# Patient Record
Sex: MALE | Race: BLACK OR AFRICAN AMERICAN | NOT HISPANIC OR LATINO | URBAN - METROPOLITAN AREA
[De-identification: names, ages, dates, MRNs, and addresses within clinical notes are randomized per-mention and may not be internally consistent; named-entity substitution may affect disease eponyms.]

---

## 2023-06-08 ENCOUNTER — OUTPATIENT (OUTPATIENT)
Dept: OUTPATIENT SERVICES | Facility: HOSPITAL | Age: 68
LOS: 1 days | End: 2023-06-08
Payer: MEDICARE

## 2023-06-08 DIAGNOSIS — Z98.890 OTHER SPECIFIED POSTPROCEDURAL STATES: Chronic | ICD-10-CM

## 2023-06-08 DIAGNOSIS — Z01.818 ENCOUNTER FOR OTHER PREPROCEDURAL EXAMINATION: ICD-10-CM

## 2023-06-08 LAB
ABO RH CONFIRMATION: SIGNIFICANT CHANGE UP
ANION GAP SERPL CALC-SCNC: 1 MMOL/L — LOW (ref 5–17)
BASOPHILS # BLD AUTO: 0.04 K/UL — SIGNIFICANT CHANGE UP (ref 0–0.2)
BASOPHILS NFR BLD AUTO: 0.7 % — SIGNIFICANT CHANGE UP (ref 0–2)
BLD GP AB SCN SERPL QL: SIGNIFICANT CHANGE UP
BUN SERPL-MCNC: 14 MG/DL — SIGNIFICANT CHANGE UP (ref 7–23)
CALCIUM SERPL-MCNC: 9 MG/DL — SIGNIFICANT CHANGE UP (ref 8.5–10.1)
CHLORIDE SERPL-SCNC: 106 MMOL/L — SIGNIFICANT CHANGE UP (ref 96–108)
CO2 SERPL-SCNC: 33 MMOL/L — HIGH (ref 22–31)
CREAT SERPL-MCNC: 0.95 MG/DL — SIGNIFICANT CHANGE UP (ref 0.5–1.3)
EGFR: 89 ML/MIN/1.73M2 — SIGNIFICANT CHANGE UP
EOSINOPHIL # BLD AUTO: 0.11 K/UL — SIGNIFICANT CHANGE UP (ref 0–0.5)
EOSINOPHIL NFR BLD AUTO: 2 % — SIGNIFICANT CHANGE UP (ref 0–6)
GLUCOSE SERPL-MCNC: 124 MG/DL — HIGH (ref 70–99)
HCT VFR BLD CALC: 50 % — SIGNIFICANT CHANGE UP (ref 39–50)
HGB BLD-MCNC: 16.1 G/DL — SIGNIFICANT CHANGE UP (ref 13–17)
IMM GRANULOCYTES NFR BLD AUTO: 0.4 % — SIGNIFICANT CHANGE UP (ref 0–0.9)
LYMPHOCYTES # BLD AUTO: 1.15 K/UL — SIGNIFICANT CHANGE UP (ref 1–3.3)
LYMPHOCYTES # BLD AUTO: 21.3 % — SIGNIFICANT CHANGE UP (ref 13–44)
MCHC RBC-ENTMCNC: 26.5 PG — LOW (ref 27–34)
MCHC RBC-ENTMCNC: 32.2 GM/DL — SIGNIFICANT CHANGE UP (ref 32–36)
MCV RBC AUTO: 82.2 FL — SIGNIFICANT CHANGE UP (ref 80–100)
MONOCYTES # BLD AUTO: 0.32 K/UL — SIGNIFICANT CHANGE UP (ref 0–0.9)
MONOCYTES NFR BLD AUTO: 5.9 % — SIGNIFICANT CHANGE UP (ref 2–14)
NEUTROPHILS # BLD AUTO: 3.77 K/UL — SIGNIFICANT CHANGE UP (ref 1.8–7.4)
NEUTROPHILS NFR BLD AUTO: 69.7 % — SIGNIFICANT CHANGE UP (ref 43–77)
PLATELET # BLD AUTO: 241 K/UL — SIGNIFICANT CHANGE UP (ref 150–400)
POTASSIUM SERPL-MCNC: 3.8 MMOL/L — SIGNIFICANT CHANGE UP (ref 3.5–5.3)
POTASSIUM SERPL-SCNC: 3.8 MMOL/L — SIGNIFICANT CHANGE UP (ref 3.5–5.3)
RBC # BLD: 6.08 M/UL — HIGH (ref 4.2–5.8)
RBC # FLD: 14.1 % — SIGNIFICANT CHANGE UP (ref 10.3–14.5)
SODIUM SERPL-SCNC: 140 MMOL/L — SIGNIFICANT CHANGE UP (ref 135–145)
WBC # BLD: 5.41 K/UL — SIGNIFICANT CHANGE UP (ref 3.8–10.5)
WBC # FLD AUTO: 5.41 K/UL — SIGNIFICANT CHANGE UP (ref 3.8–10.5)

## 2023-06-08 PROCEDURE — 93010 ELECTROCARDIOGRAM REPORT: CPT

## 2023-06-08 PROCEDURE — 86850 RBC ANTIBODY SCREEN: CPT

## 2023-06-08 PROCEDURE — 93005 ELECTROCARDIOGRAM TRACING: CPT

## 2023-06-08 PROCEDURE — 86900 BLOOD TYPING SEROLOGIC ABO: CPT

## 2023-06-08 PROCEDURE — 36415 COLL VENOUS BLD VENIPUNCTURE: CPT

## 2023-06-08 PROCEDURE — 85025 COMPLETE CBC W/AUTO DIFF WBC: CPT

## 2023-06-08 PROCEDURE — 80048 BASIC METABOLIC PNL TOTAL CA: CPT

## 2023-06-08 PROCEDURE — 87641 MR-STAPH DNA AMP PROBE: CPT

## 2023-06-08 PROCEDURE — 87640 STAPH A DNA AMP PROBE: CPT

## 2023-06-08 PROCEDURE — 86901 BLOOD TYPING SEROLOGIC RH(D): CPT

## 2023-06-08 NOTE — CHART NOTE - NSCHARTNOTEFT_GEN_A_CORE
6/8/2023--67 y.o male scheduled for a Left Inguinal Hernia Repair  VS: BP- 159/81  P- 66 T- 97.6 SpO2- 98%  Plan  1. Stop all NSAIDS, herbal supplements and vitamins for 7 days.  2. NPO at midnight.  3. Take the following medications Labetalol, Amlodipine, Lisinopril with small sips of water on the morning of your procedure/surgery.  4. Use EZ sponges as directed  5. Use mupirocin as directed  6. Labs, EKG as per surgeon  7. PMD SARAH Blackburn  visit for optimization prior to surgery as per surgeon.  8. Preprocedure education provided

## 2023-06-08 NOTE — ASU PATIENT PROFILE, ADULT - ALCOHOL USE HISTORY SINGLE SELECT
Quality 431: Preventive Care And Screening: Unhealthy Alcohol Use - Screening: Patient screened for unhealthy alcohol use using a single question and scores less than 2 times per year
Quality 111:Pneumonia Vaccination Status For Older Adults: Pneumococcal Vaccination Previously Received
Quality 130: Documentation Of Current Medications In The Medical Record: Current Medications Documented
Detail Level: Zone
Quality 226: Preventive Care And Screening: Tobacco Use: Screening And Cessation Intervention: Patient screened for tobacco and is an ex-smoker
Quality 110: Preventive Care And Screening: Influenza Immunization: Influenza immunization was not ordered or administered, reason not given
Quality 47: Advance Care Plan: Advance Care Planning discussed and documented in the medical record; patient did not wish or was not able to name a surrogate decision maker or provide an advance care plan.
yes...

## 2023-06-08 NOTE — ASU PATIENT PROFILE, ADULT - NSICDXPASTMEDICALHX_GEN_ALL_CORE_FT
PAST MEDICAL HISTORY:  Borderline hyperlipidemia diet controlled    Glaucoma     HTN (hypertension)     Left inguinal hernia     Sensitive skin

## 2023-06-08 NOTE — ASU PATIENT PROFILE, ADULT - NS PRO PT RIGHT SUPPORT PERSON
same name as above Azithromycin Counseling:  I discussed with the patient the risks of azithromycin including but not limited to GI upset, allergic reaction, drug rash, diarrhea, and yeast infections.

## 2023-06-09 DIAGNOSIS — Z01.818 ENCOUNTER FOR OTHER PREPROCEDURAL EXAMINATION: ICD-10-CM

## 2023-06-09 LAB
MRSA PCR RESULT.: SIGNIFICANT CHANGE UP
S AUREUS DNA NOSE QL NAA+PROBE: SIGNIFICANT CHANGE UP

## 2023-06-15 ENCOUNTER — OUTPATIENT (OUTPATIENT)
Dept: INPATIENT UNIT | Facility: HOSPITAL | Age: 68
LOS: 1 days | Discharge: HOME | End: 2023-06-15
Payer: MEDICARE

## 2023-06-15 ENCOUNTER — TRANSCRIPTION ENCOUNTER (OUTPATIENT)
Age: 68
End: 2023-06-15

## 2023-06-15 VITALS
TEMPERATURE: 98 F | HEART RATE: 75 BPM | WEIGHT: 195.11 LBS | RESPIRATION RATE: 16 BRPM | HEIGHT: 64 IN | OXYGEN SATURATION: 96 % | SYSTOLIC BLOOD PRESSURE: 175 MMHG | DIASTOLIC BLOOD PRESSURE: 98 MMHG

## 2023-06-15 VITALS
TEMPERATURE: 98 F | DIASTOLIC BLOOD PRESSURE: 69 MMHG | RESPIRATION RATE: 18 BRPM | HEART RATE: 73 BPM | OXYGEN SATURATION: 95 % | SYSTOLIC BLOOD PRESSURE: 133 MMHG

## 2023-06-15 DIAGNOSIS — K40.90 UNILATERAL INGUINAL HERNIA, WITHOUT OBSTRUCTION OR GANGRENE, NOT SPECIFIED AS RECURRENT: ICD-10-CM

## 2023-06-15 DIAGNOSIS — Z98.890 OTHER SPECIFIED POSTPROCEDURAL STATES: Chronic | ICD-10-CM

## 2023-06-15 PROCEDURE — 88304 TISSUE EXAM BY PATHOLOGIST: CPT | Mod: 26

## 2023-06-15 PROCEDURE — C1781: CPT

## 2023-06-15 PROCEDURE — 49505 PRP I/HERN INIT REDUC >5 YR: CPT | Mod: AS,LT

## 2023-06-15 PROCEDURE — 88304 TISSUE EXAM BY PATHOLOGIST: CPT

## 2023-06-15 RX ORDER — SODIUM CHLORIDE 9 MG/ML
1000 INJECTION, SOLUTION INTRAVENOUS
Refills: 0 | Status: DISCONTINUED | OUTPATIENT
Start: 2023-06-15 | End: 2023-06-15

## 2023-06-15 RX ORDER — FENTANYL CITRATE 50 UG/ML
50 INJECTION INTRAVENOUS
Refills: 0 | Status: DISCONTINUED | OUTPATIENT
Start: 2023-06-15 | End: 2023-06-15

## 2023-06-15 RX ORDER — HYDROCODONE BITARTRATE AND ACETAMINOPHEN 7.5; 325 MG/15ML; MG/15ML
1 SOLUTION ORAL
Qty: 20 | Refills: 0
Start: 2023-06-15 | End: 2023-06-18

## 2023-06-15 RX ORDER — OXYCODONE HYDROCHLORIDE 5 MG/1
5 TABLET ORAL ONCE
Refills: 0 | Status: DISCONTINUED | OUTPATIENT
Start: 2023-06-15 | End: 2023-06-15

## 2023-06-15 RX ORDER — ONDANSETRON 8 MG/1
4 TABLET, FILM COATED ORAL ONCE
Refills: 0 | Status: DISCONTINUED | OUTPATIENT
Start: 2023-06-15 | End: 2023-06-15

## 2023-06-15 NOTE — ASU PATIENT PROFILE, ADULT - FALL HARM RISK - UNIVERSAL INTERVENTIONS
Bed in lowest position, wheels locked, appropriate side rails in place/Call bell, personal items and telephone in reach/Instruct patient to call for assistance before getting out of bed or chair/Non-slip footwear when patient is out of bed/[place] to call system/Physically safe environment - no spills, clutter or unnecessary equipment/Purposeful Proactive Rounding/Room/bathroom lighting operational, light cord in reach

## 2023-06-20 LAB — SURGICAL PATHOLOGY STUDY: SIGNIFICANT CHANGE UP

## 2023-06-22 DIAGNOSIS — E78.00 PURE HYPERCHOLESTEROLEMIA, UNSPECIFIED: ICD-10-CM

## 2023-06-22 DIAGNOSIS — K40.90 UNILATERAL INGUINAL HERNIA, WITHOUT OBSTRUCTION OR GANGRENE, NOT SPECIFIED AS RECURRENT: ICD-10-CM

## 2023-06-22 DIAGNOSIS — Z88.0 ALLERGY STATUS TO PENICILLIN: ICD-10-CM

## 2023-06-22 DIAGNOSIS — D17.6 BENIGN LIPOMATOUS NEOPLASM OF SPERMATIC CORD: ICD-10-CM

## 2023-06-22 DIAGNOSIS — E55.9 VITAMIN D DEFICIENCY, UNSPECIFIED: ICD-10-CM

## 2023-06-22 DIAGNOSIS — I10 ESSENTIAL (PRIMARY) HYPERTENSION: ICD-10-CM

## 2023-06-22 DIAGNOSIS — Z79.82 LONG TERM (CURRENT) USE OF ASPIRIN: ICD-10-CM

## 2024-03-18 PROBLEM — I10 ESSENTIAL (PRIMARY) HYPERTENSION: Chronic | Status: ACTIVE | Noted: 2023-06-08

## 2024-03-18 PROBLEM — H40.9 UNSPECIFIED GLAUCOMA: Chronic | Status: ACTIVE | Noted: 2023-06-08

## 2024-03-18 PROBLEM — K40.90 UNILATERAL INGUINAL HERNIA, WITHOUT OBSTRUCTION OR GANGRENE, NOT SPECIFIED AS RECURRENT: Chronic | Status: ACTIVE | Noted: 2023-06-08

## 2024-03-28 ENCOUNTER — APPOINTMENT (OUTPATIENT)
Dept: UROLOGY | Facility: CLINIC | Age: 69
End: 2024-03-28
Payer: MEDICARE

## 2024-03-28 VITALS
HEART RATE: 64 BPM | WEIGHT: 190 LBS | HEIGHT: 63 IN | BODY MASS INDEX: 33.66 KG/M2 | DIASTOLIC BLOOD PRESSURE: 83 MMHG | SYSTOLIC BLOOD PRESSURE: 163 MMHG

## 2024-03-28 PROCEDURE — G2211 COMPLEX E/M VISIT ADD ON: CPT

## 2024-03-28 PROCEDURE — 99204 OFFICE O/P NEW MOD 45 MIN: CPT

## 2024-03-28 RX ORDER — LABETALOL HYDROCHLORIDE 100 MG/1
100 TABLET, FILM COATED ORAL
Refills: 0 | Status: ACTIVE | COMMUNITY

## 2024-03-28 RX ORDER — ATORVASTATIN CALCIUM 80 MG/1
TABLET, FILM COATED ORAL
Refills: 0 | Status: ACTIVE | COMMUNITY

## 2024-03-28 RX ORDER — SODIUM PHOSPHATE, DIBASIC AND SODIUM PHOSPHATE, MONOBASIC 7; 19 G/230ML; G/230ML
ENEMA RECTAL
Qty: 1 | Refills: 0 | Status: ACTIVE | COMMUNITY
Start: 2024-03-28 | End: 1900-01-01

## 2024-03-28 RX ORDER — AMLODIPINE BESYLATE 5 MG/1
5 TABLET ORAL
Refills: 0 | Status: ACTIVE | COMMUNITY

## 2024-03-28 RX ORDER — LISINOPRIL 2.5 MG/1
2.5 TABLET ORAL
Refills: 0 | Status: ACTIVE | COMMUNITY

## 2024-03-29 LAB
ANION GAP SERPL CALC-SCNC: 13 MMOL/L
APPEARANCE: CLEAR
BACTERIA: NEGATIVE /HPF
BILIRUBIN URINE: NEGATIVE
BLOOD URINE: NEGATIVE
BUN SERPL-MCNC: 15 MG/DL
CALCIUM SERPL-MCNC: 9.8 MG/DL
CAST: 0 /LPF
CHLORIDE SERPL-SCNC: 101 MMOL/L
CO2 SERPL-SCNC: 26 MMOL/L
COLOR: YELLOW
CREAT SERPL-MCNC: 0.95 MG/DL
EGFR: 88 ML/MIN/1.73M2
EPITHELIAL CELLS: 0 /HPF
GLUCOSE QUALITATIVE U: NEGATIVE MG/DL
GLUCOSE SERPL-MCNC: 78 MG/DL
KETONES URINE: ABNORMAL MG/DL
LEUKOCYTE ESTERASE URINE: ABNORMAL
MICROSCOPIC-UA: NORMAL
NITRITE URINE: NEGATIVE
PH URINE: 6
POTASSIUM SERPL-SCNC: 4.6 MMOL/L
PROTEIN URINE: NORMAL MG/DL
PSA FREE FLD-MCNC: 14 %
PSA FREE SERPL-MCNC: 0.7 NG/ML
PSA SERPL-MCNC: 5.01 NG/ML
RED BLOOD CELLS URINE: 1 /HPF
SODIUM SERPL-SCNC: 141 MMOL/L
SPECIFIC GRAVITY URINE: 1.02
UROBILINOGEN URINE: 0.2 MG/DL
WHITE BLOOD CELLS URINE: 0 /HPF

## 2024-03-30 LAB — BACTERIA UR CULT: NORMAL

## 2024-03-31 ENCOUNTER — NON-APPOINTMENT (OUTPATIENT)
Age: 69
End: 2024-03-31

## 2024-04-04 ENCOUNTER — RESULT REVIEW (OUTPATIENT)
Age: 69
End: 2024-04-04

## 2024-04-04 ENCOUNTER — APPOINTMENT (OUTPATIENT)
Dept: MRI IMAGING | Facility: IMAGING CENTER | Age: 69
End: 2024-04-04
Payer: MEDICARE

## 2024-04-04 ENCOUNTER — OUTPATIENT (OUTPATIENT)
Dept: OUTPATIENT SERVICES | Facility: HOSPITAL | Age: 69
LOS: 1 days | End: 2024-04-04
Payer: MEDICARE

## 2024-04-04 DIAGNOSIS — Z78.9 OTHER SPECIFIED HEALTH STATUS: ICD-10-CM

## 2024-04-04 DIAGNOSIS — R97.20 ELEVATED PROSTATE SPECIFIC ANTIGEN [PSA]: ICD-10-CM

## 2024-04-04 DIAGNOSIS — Z98.890 OTHER SPECIFIED POSTPROCEDURAL STATES: Chronic | ICD-10-CM

## 2024-04-04 PROCEDURE — 72197 MRI PELVIS W/O & W/DYE: CPT

## 2024-04-04 PROCEDURE — 76498P: CUSTOM | Mod: 26,MH

## 2024-04-04 PROCEDURE — 72197 MRI PELVIS W/O & W/DYE: CPT | Mod: 26,MH

## 2024-04-04 PROCEDURE — A9585: CPT

## 2024-04-04 PROCEDURE — 76498 UNLISTED MR PROCEDURE: CPT

## 2024-04-09 DIAGNOSIS — Z00.00 ENCOUNTER FOR GENERAL ADULT MEDICAL EXAMINATION W/OUT ABNORMAL FINDINGS: ICD-10-CM

## 2024-04-09 RX ORDER — SODIUM PHOSPHATE, DIBASIC AND SODIUM PHOSPHATE, MONOBASIC 7; 19 G/230ML; G/230ML
ENEMA RECTAL
Qty: 1 | Refills: 0 | Status: ACTIVE | COMMUNITY
Start: 2024-04-09 | End: 1900-01-01

## 2024-04-16 ENCOUNTER — OUTPATIENT (OUTPATIENT)
Dept: OUTPATIENT SERVICES | Facility: HOSPITAL | Age: 69
LOS: 1 days | End: 2024-04-16
Payer: MEDICARE

## 2024-04-16 DIAGNOSIS — Z98.890 OTHER SPECIFIED POSTPROCEDURAL STATES: Chronic | ICD-10-CM

## 2024-04-16 DIAGNOSIS — R97.20 ELEVATED PROSTATE SPECIFIC ANTIGEN [PSA]: ICD-10-CM

## 2024-04-16 PROCEDURE — C8001: CPT

## 2024-04-17 DIAGNOSIS — R97.20 ELEVATED PROSTATE, SPECIFIC ANTIGEN [PSA]: ICD-10-CM

## 2024-04-18 ENCOUNTER — APPOINTMENT (OUTPATIENT)
Dept: UROLOGY | Facility: CLINIC | Age: 69
End: 2024-04-18

## 2024-04-30 ENCOUNTER — OUTPATIENT (OUTPATIENT)
Dept: OUTPATIENT SERVICES | Facility: HOSPITAL | Age: 69
LOS: 1 days | End: 2024-04-30

## 2024-04-30 VITALS
SYSTOLIC BLOOD PRESSURE: 142 MMHG | TEMPERATURE: 97 F | HEIGHT: 64.5 IN | RESPIRATION RATE: 18 BRPM | OXYGEN SATURATION: 98 % | HEART RATE: 61 BPM | DIASTOLIC BLOOD PRESSURE: 80 MMHG | WEIGHT: 192.02 LBS

## 2024-04-30 DIAGNOSIS — Z87.898 PERSONAL HISTORY OF OTHER SPECIFIED CONDITIONS: ICD-10-CM

## 2024-04-30 DIAGNOSIS — Z98.890 OTHER SPECIFIED POSTPROCEDURAL STATES: Chronic | ICD-10-CM

## 2024-04-30 DIAGNOSIS — R97.20 ELEVATED PROSTATE SPECIFIC ANTIGEN [PSA]: ICD-10-CM

## 2024-04-30 RX ORDER — DORZOLAMIDE HYDROCHLORIDE 20 MG/ML
1 SOLUTION/ DROPS OPHTHALMIC
Refills: 0 | DISCHARGE

## 2024-04-30 NOTE — H&P PST ADULT - ATTENDING COMMENTS
Patient with elevated PSA. He wishes to proceed with MRI guided transperineal fusion biopsy of prostate. Risks, benefits, and alternatives were discussed. I answered his questions. Patient wishes to proceed with procedure. Informed consent was obtained.

## 2024-04-30 NOTE — H&P PST ADULT - NSANTHOSAYNRD_GEN_A_CORE
No. ULYSSES screening performed.  STOP BANG Legend: 0-2 = LOW Risk; 3-4 = INTERMEDIATE Risk; 5-8 = HIGH Risk

## 2024-04-30 NOTE — H&P PST ADULT - PROBLEM SELECTOR PLAN 1
Patient tentatively scheduled for MRI guided transperineal fusion biopsy on 5/6/24.  Pre-op instructions provided. Pt given verbal and written instructions with teach back on pepcid. Pt verbalized understanding.  Urine culture was done at [facility].  Copy of EKG requested. Patient tentatively scheduled for MRI guided transperineal fusion biopsy on 5/6/24.  Pre-op instructions provided. Pt given verbal and written instructions with teach back on pepcid. Pt verbalized understanding.  Urine culture was done at [facility].  Copy of EKG requested.  Copy of BMP in chart.

## 2024-04-30 NOTE — H&P PST ADULT - NSICDXPASTMEDICALHX_GEN_ALL_CORE_FT
PAST MEDICAL HISTORY:  Elevated prostate specific antigen (PSA)     Glaucoma     Hemorrhoids     HLD (hyperlipidemia)     HTN (hypertension)     Left inguinal hernia

## 2024-04-30 NOTE — H&P PST ADULT - HISTORY OF PRESENT ILLNESS
68 year old male with pmhx of HTN, HLD, Glaucoma, BPH, Hemorrhoids, presents for pre-op evaluation for diagnosis of Elevated prostate specific antigen psa. Patient is scheduled for MRI guided transperineal fusion biopsy. Patient was found to have elevated PSA and had MRI done which showed Left, posterior medial-lateral (PZpm-pl), midgland, peripheral zone lesion. Pt c/o intermittent increased urinary frequency. Denies incontinence, nocturia, dysuria, cp, doss, sob, fever,  68 year old male with pmhx of HTN, HLD, Glaucoma, BPH, Hemorrhoids, presents for pre-op evaluation for diagnosis of Elevated prostate specific antigen psa. Patient is scheduled for MRI guided transperineal fusion biopsy. Patient was found to have elevated PSA and had MRI done which showed Left, posterior medial-lateral, midgland, peripheral zone lesion. Pt c/o intermittent increased urinary frequency. Denies incontinence, nocturia, dysuria, cp, doss, sob, fever.

## 2024-04-30 NOTE — H&P PST ADULT - NEGATIVE GENERAL GENITOURINARY SYMPTOMS
no no hematuria/no flank pain L/no flank pain R/no urine discoloration/no bladder infections/no incontinence/no dysuria/no urinary hesitancy/no nocturia

## 2024-04-30 NOTE — H&P PST ADULT - NEGATIVE ENMT SYMPTOMS
Denies dentures. Denies loose teeth./no hearing difficulty/no ear pain/no tinnitus/no vertigo/no sinus symptoms/no nasal congestion/no nasal discharge/no nasal obstruction/no nose bleeds/no dry mouth/no throat pain/no dysphagia

## 2024-05-01 LAB
CULTURE RESULTS: SIGNIFICANT CHANGE UP
SPECIMEN SOURCE: SIGNIFICANT CHANGE UP

## 2024-05-05 ENCOUNTER — TRANSCRIPTION ENCOUNTER (OUTPATIENT)
Age: 69
End: 2024-05-05

## 2024-05-06 ENCOUNTER — TRANSCRIPTION ENCOUNTER (OUTPATIENT)
Age: 69
End: 2024-05-06

## 2024-05-06 ENCOUNTER — RESULT REVIEW (OUTPATIENT)
Age: 69
End: 2024-05-06

## 2024-05-06 ENCOUNTER — OUTPATIENT (OUTPATIENT)
Dept: OUTPATIENT SERVICES | Facility: HOSPITAL | Age: 69
LOS: 1 days | Discharge: HOME | End: 2024-05-06
Payer: MEDICARE

## 2024-05-06 ENCOUNTER — APPOINTMENT (OUTPATIENT)
Dept: UROLOGY | Facility: AMBULATORY SURGERY CENTER | Age: 69
End: 2024-05-06

## 2024-05-06 VITALS
RESPIRATION RATE: 16 BRPM | DIASTOLIC BLOOD PRESSURE: 80 MMHG | HEART RATE: 70 BPM | TEMPERATURE: 98 F | HEIGHT: 64.5 IN | WEIGHT: 192.02 LBS | OXYGEN SATURATION: 96 % | SYSTOLIC BLOOD PRESSURE: 142 MMHG

## 2024-05-06 VITALS — HEART RATE: 70 BPM | OXYGEN SATURATION: 97 % | TEMPERATURE: 98 F | RESPIRATION RATE: 15 BRPM

## 2024-05-06 DIAGNOSIS — R97.20 ELEVATED PROSTATE SPECIFIC ANTIGEN [PSA]: ICD-10-CM

## 2024-05-06 DIAGNOSIS — Z98.890 OTHER SPECIFIED POSTPROCEDURAL STATES: Chronic | ICD-10-CM

## 2024-05-06 PROCEDURE — 76999F: CUSTOM | Mod: 26

## 2024-05-06 PROCEDURE — G0416: CPT | Mod: 26

## 2024-05-06 PROCEDURE — 88381 MICRODISSECTION MANUAL: CPT | Mod: 26

## 2024-05-06 PROCEDURE — 55700: CPT

## 2024-05-06 RX ORDER — LISINOPRIL 2.5 MG/1
1 TABLET ORAL
Refills: 0 | DISCHARGE

## 2024-05-06 RX ORDER — ATORVASTATIN CALCIUM 80 MG/1
1 TABLET, FILM COATED ORAL
Refills: 0 | DISCHARGE

## 2024-05-06 RX ORDER — FAMOTIDINE 10 MG/ML
1 INJECTION INTRAVENOUS
Refills: 0 | DISCHARGE

## 2024-05-06 RX ORDER — AMLODIPINE BESYLATE 2.5 MG/1
1 TABLET ORAL
Refills: 0 | DISCHARGE

## 2024-05-06 RX ORDER — ASPIRIN/CALCIUM CARB/MAGNESIUM 324 MG
1 TABLET ORAL
Refills: 0 | DISCHARGE

## 2024-05-06 RX ORDER — DORZOLAMIDE HYDROCHLORIDE TIMOLOL MALEATE 20; 5 MG/ML; MG/ML
1 SOLUTION/ DROPS OPHTHALMIC
Refills: 0 | DISCHARGE

## 2024-05-06 RX ORDER — HYDROCHLOROTHIAZIDE 25 MG
1 TABLET ORAL
Refills: 0 | DISCHARGE

## 2024-05-06 RX ORDER — SODIUM CHLORIDE 9 MG/ML
1000 INJECTION, SOLUTION INTRAVENOUS
Refills: 0 | Status: DISCONTINUED | OUTPATIENT
Start: 2024-05-06 | End: 2024-05-21

## 2024-05-06 RX ORDER — LABETALOL HCL 100 MG
1 TABLET ORAL
Refills: 0 | DISCHARGE

## 2024-05-06 NOTE — ASU PREOPERATIVE ASSESSMENT, ADULT (IPARK ONLY) - ESCORT HOME
Left message for patient at      Telephone Information:   Mobile [phone]    to schedule procedure.  Patient to return call to Open Access Scheduling Patient Line [phone]     Sent letter.     Spouse

## 2024-05-06 NOTE — ASU DISCHARGE PLAN (ADULT/PEDIATRIC) - ASU DC SPECIAL INSTRUCTIONSFT
PAIN CONTROL: You may take 650 mg of Tylenol every 4-6 hours. Do not exceed 4 grams of Tylenol daily.     WOUND Care- expected to see blood in the stool or urine for a few days after the procedure.  ANTIBIOTICS: None    BATHING: resume showering as usual    ANTICOAGULATION: REstart blood thinners tomorrow.     ACTIVITY: No heavy lifting or straining. Otherwise, you may return to your usual level of physical activity.    DIET: Return to your usual diet.    NOTIFY YOUR SURGEON IF: You have any bleeding that does not stop, any pus draining from your wound, any fever (over 100.4 F) or chills, persistent nausea/vomiting, persistent diarrhea, or if your pain is not controlled on your discharge pain medications.    FOLLOW-UP:  Please call your surgeon to make a follow-up appointment within 1-2 weeks of discharge  You will receive a call with the results

## 2024-05-06 NOTE — ASU DISCHARGE PLAN (ADULT/PEDIATRIC) - CALL YOUR DOCTOR IF YOU HAVE ANY OF THE FOLLOWING:
Pain not relieved by Medications/Fever greater than (need to indicate Fahrenheit or Celsius)/Unable to urinate/Excessive diarrhea/Inability to tolerate liquids or foods Bleeding that does not stop/Pain not relieved by Medications/Fever greater than (need to indicate Fahrenheit or Celsius)/Nausea and vomiting that does not stop/Unable to urinate/Excessive diarrhea/Inability to tolerate liquids or foods

## 2024-05-06 NOTE — BRIEF OPERATIVE NOTE - NSICDXBRIEFPROCEDURE_GEN_ALL_CORE_FT
PROCEDURES:  Biopsy of prostate using magnetic resonance imaging-ultrasound fusion guidance 06-May-2024 15:25:57  Chelsi Mares

## 2024-05-06 NOTE — ASU DISCHARGE PLAN (ADULT/PEDIATRIC) - NS MD DC FALL RISK RISK
For information on Fall & Injury Prevention, visit: [email or web address] OR  [email or web address] OR  https://www.cdc.gov/steadi/patient.html

## 2024-05-09 LAB — SURGICAL PATHOLOGY STUDY: SIGNIFICANT CHANGE UP

## 2024-05-11 NOTE — LETTER BODY
[FreeTextEntry1] : Alexandr Braun MD [address] [place] [address] [phone]  Dear Dr. Braun    Reason for Visit: Elevated PSA.   This is a 68 year-old gentleman with elevated PSA. Patient reports that he [has not had] previous prostate biopsy. His current PSA is 6.09. Patient denies any hematuria or lower urinary tract symptoms. He denies any pain. The patient denies any aggravating or relieving factors. The patient denies any interference of function. The patient is entirely asymptomatic. All other review of systems are negative. He has no cancer in his family medical history. He has no previous surgical history. Past medical history, family history and social history were inquired and were noncontributory to current condition. The patient does not use tobacco or drink alcohol. Medications and allergies were reviewed. He has no known allergies to medication.   On examination, the patient is a healthy-appearing gentleman in no acute distress. He is alert and oriented follows commands. He has normal mood and affect. He is normocephalic. Neck is supple. Oral no thrush. Respirations are unlabored. His abdomen is soft and nontender. Bladder is nonpalpable. No CVA tenderness. Neurologically he is grossly intact. No peripheral edema. Skin without gross abnormality. He has normal male external genitalia. Normal meatus. Bilateral testes are descended intrascrotally and normal to palpation. On rectal examination, there is normal sphincter tone. The prostate is clinically benign without focal induration or nodularity.   His PSA today was 6.09.  Assessment: Elevated PSA.   I counseled the patient. I discussed with him the risk of occult malignancy. I recommended he obtain a prostate MRI for fusion targeted prostate biopsy. Risks and alternatives were discussed. I answered the patient questions. He will take the necessary preparations for the procedure, including fleet enema. I also recommended he repeat PSA today to ensure stability. The patient will follow-up as directed and will contact me with any questions or concerns. Thank you for the opportunity to participate in the care of Mr. POMPEY. I will keep you updated on his progress.   Plan: Repeat PSA. Fusion biopsy. Prostate MRI.

## 2024-05-11 NOTE — ADDENDUM
[FreeTextEntry1] : Entered by Kelsey Carroll, acting as scribe for Dr. Claude Samson. The documentation recorded by the scribe accurately reflects the service I personally performed and the decisions made by me.

## 2024-05-15 ENCOUNTER — APPOINTMENT (OUTPATIENT)
Dept: UROLOGY | Facility: AMBULATORY SURGERY CENTER | Age: 69
End: 2024-05-15

## 2024-05-20 PROBLEM — E78.5 HYPERLIPIDEMIA, UNSPECIFIED: Chronic | Status: ACTIVE | Noted: 2024-04-30

## 2024-05-20 PROBLEM — K64.9 UNSPECIFIED HEMORRHOIDS: Chronic | Status: ACTIVE | Noted: 2024-04-30

## 2024-05-20 PROBLEM — R97.20 ELEVATED PROSTATE SPECIFIC ANTIGEN [PSA]: Chronic | Status: ACTIVE | Noted: 2024-04-30

## 2024-05-21 ENCOUNTER — APPOINTMENT (OUTPATIENT)
Dept: NUCLEAR MEDICINE | Facility: CLINIC | Age: 69
End: 2024-05-21

## 2024-06-03 ENCOUNTER — APPOINTMENT (OUTPATIENT)
Dept: NUCLEAR MEDICINE | Facility: CLINIC | Age: 69
End: 2024-06-03
Payer: MEDICARE

## 2024-06-03 ENCOUNTER — OUTPATIENT (OUTPATIENT)
Dept: OUTPATIENT SERVICES | Facility: HOSPITAL | Age: 69
LOS: 1 days | End: 2024-06-03
Payer: MEDICARE

## 2024-06-03 DIAGNOSIS — Z98.890 OTHER SPECIFIED POSTPROCEDURAL STATES: Chronic | ICD-10-CM

## 2024-06-03 DIAGNOSIS — C61 MALIGNANT NEOPLASM OF PROSTATE: ICD-10-CM

## 2024-06-03 PROCEDURE — 78816 PET IMAGE W/CT FULL BODY: CPT | Mod: 26,MH,PI

## 2024-06-03 PROCEDURE — 78816 PET IMAGE W/CT FULL BODY: CPT

## 2024-06-03 PROCEDURE — A9595: CPT

## 2024-06-10 ENCOUNTER — APPOINTMENT (OUTPATIENT)
Dept: UROLOGY | Facility: CLINIC | Age: 69
End: 2024-06-10
Payer: MEDICARE

## 2024-06-10 PROCEDURE — 99214 OFFICE O/P EST MOD 30 MIN: CPT

## 2024-06-10 PROCEDURE — G2211 COMPLEX E/M VISIT ADD ON: CPT

## 2024-06-10 NOTE — HISTORY OF PRESENT ILLNESS
[FreeTextEntry1] :   Follow-up prostate cancer.  PSA 5.01.  PSMA scan was negative.  Patient is  interested in radiation therapy.    Encourage patient to consider 2 years of   Androgen deprivation therapy with radiation therapy.  refer to Dr. Guerrero as he lives near [place]  Please refer to URO Consult Note.

## 2024-06-10 NOTE — ADDENDUM
[FreeTextEntry1] : Entered by Sanya Cortez, acting as scribe for Dr. Claude Samson. The documentation recorded by the scribe accurately reflects the service I personally performed and the decisions made by me.

## 2024-06-10 NOTE — LETTER BODY
[FreeTextEntry1] : Alexandr Braun MD [address] [place] [address] [phone]  Dear Dr. Braun   Reason for Visit: Elevated PSA. Prostate cancer  This is a 68 year-old gentleman with elevated PSA and prostate cancer. His MRI in April 2024 demonstrated a suspicious prostatic lesion, PI RADS 4. His current PSA is 5.01. Patient is here today for follow up. His PSMA scan was negative. He denies any aggravating or relieving factors. The patient denies any interference of function. The patient is entirely asymptomatic. All other review of systems are negative. He has no cancer in his family medical history. He has no previous surgical history. Past medical history, family history and social history were inquired and were noncontributory to current condition. The patient does not use tobacco or drink alcohol. Medications and allergies were reviewed. He has no known allergies to medication.  On examination, the patient is a healthy-appearing gentleman in no acute distress. He is alert and oriented follows commands. He has normal mood and affect. He is normocephalic. Neck is supple. Oral no thrush. Respirations are unlabored. His abdomen is soft and nontender. Bladder is nonpalpable. No CVA tenderness. Neurologically he is grossly intact. No peripheral edema. Skin without gross abnormality.   His recent PSA was 5.01, which is elevated.  I personally reviewed the PSMA PET CT scan with patient today. CT scan demonstrated no evidence of metastatic disease.  Assessment: Elevated PSA. Prostate cancer  I counseled the patient. In terms of his prostate cancer, his PSMA scan was negative. His current PSA is 5.01, which is elevated. The patient was interested in radiation therapy. I encouraged the patient to consider 2 years of Androgen deprivation therapy with radiation therapy. I referred the patient to Dr. Guerrero as he lives near [place]. I answered the patient questions. Risks and alternatives were discussed. The patient will follow-up as directed and will contact me with any questions or concerns. Thank you for the opportunity to participate in the care of Mr. POMPEY. I will keep you updated on his progress.  Plan: Consider Androgen deprivation therapy with radiation therapy. See Dr. Guerrero. Follow up as directed.

## 2024-06-24 ENCOUNTER — APPOINTMENT (OUTPATIENT)
Dept: RADIATION ONCOLOGY | Facility: CLINIC | Age: 69
End: 2024-06-24
Payer: MEDICARE

## 2024-06-24 VITALS
HEART RATE: 65 BPM | WEIGHT: 192 LBS | SYSTOLIC BLOOD PRESSURE: 160 MMHG | BODY MASS INDEX: 32.78 KG/M2 | OXYGEN SATURATION: 97 % | RESPIRATION RATE: 18 BRPM | DIASTOLIC BLOOD PRESSURE: 110 MMHG | HEIGHT: 64 IN

## 2024-06-24 DIAGNOSIS — Z80.42 FAMILY HISTORY OF MALIGNANT NEOPLASM OF PROSTATE: ICD-10-CM

## 2024-06-24 DIAGNOSIS — C61 MALIGNANT NEOPLASM OF PROSTATE: ICD-10-CM

## 2024-06-24 DIAGNOSIS — Z78.9 OTHER SPECIFIED HEALTH STATUS: ICD-10-CM

## 2024-06-24 PROCEDURE — 99204 OFFICE O/P NEW MOD 45 MIN: CPT

## 2024-06-24 RX ORDER — DORZOLAMIDE HYDROCHLORIDE AND TIMOLOL MALEATE 20; 5 MG/ML; MG/ML
SOLUTION/ DROPS OPHTHALMIC
Refills: 0 | Status: ACTIVE | COMMUNITY

## 2024-06-24 RX ORDER — HYDROCHLOROTHIAZIDE 12.5 MG/1
12.5 CAPSULE ORAL
Refills: 0 | Status: ACTIVE | COMMUNITY

## 2024-06-24 RX ORDER — ASPIRIN 81 MG
81 TABLET, DELAYED RELEASE (ENTERIC COATED) ORAL
Refills: 0 | Status: ACTIVE | COMMUNITY

## 2024-06-24 NOTE — PHYSICAL EXAM
[General Appearance - Well Developed] : well developed [Outer Ear] : the ears and nose were normal in appearance [Heart Rate And Rhythm] : heart rate and rhythm were normal [No Rectal Mass] : no rectal mass [External Hemorrhoid] : no external hemorrhoids [Blood on examination glove] : no blood on examination glove [Cervical Lymph Nodes Enlarged Posterior Bilaterally] : posterior cervical [Cervical Lymph Nodes Enlarged Anterior Bilaterally] : anterior cervical [Supraclavicular Lymph Nodes Enlarged Bilaterally] : supraclavicular [Musculoskeletal - Swelling] : no joint swelling [Normal] : oriented to person, place and time, the affect was normal, the mood was normal and not anxious [Oriented To Time, Place, And Person] : oriented to person, place, and time [FreeTextEntry1] : tight sphincter. guaic negative

## 2024-06-24 NOTE — DISEASE MANAGEMENT
[1] : T1 [0] : M0 [0-10] : 0 -10 ng/mL [Biopsy with Fusion] : Patient had a biopsy with fusion on [8] : Template Biopsy Shaniqua Score: 8 [9] : Fusion Biopsy Shaniqua Score: 9 [] : Patient had a Prostate MRI [IIIC] : IIIC [BiopsyDate] : 5/24 [MeasuredProstateVolume] : 40

## 2024-06-24 NOTE — HISTORY OF PRESENT ILLNESS
[FreeTextEntry1] : The patient is a pleasant 68 year old male diagnosed with prostate cancer referred by Dr. Claude Samson.  Patient had PSA elevation to 6.09, was seen by Urology, Dr. Samson 2/28/24. PSA 3/29/24 = 5.01   MRI 4/4 24 showed: Prostate size 4.6 x 3.6 x 4.6 cm. Volume: 40 cc LESION: #1 Location: Left, posterior medial-lateral (PZpm-pl), midgland, peripheral zone. Size 14 x 12 x 13 mm. Extra-prostatic extension: Abutment, tumor abuts but does not deform capsule. PI-RADS Assessment Category: 4, High No evidence of neurovascular bundle invasion. No seminal vesicle invasion. No pelvic adenopathy. No suspicious lesions identified. Urinary bladder: Within normal limits.  Prostate biopsy 5/6/24 revealed Adenocarcinoma, present in 9/13 cores: Shaniqua Score 3+4=7 in 3/13 cores Shaniqua Score 3+3=6 in 2/13 cores Shaniqua Score 4+3=7 in 2/13 cores Shaniqua Score 4+4=8 in 1/13 cores [place] Score 4+5=9 in 1/13 cores involving 40%, 20% and 60% of the 3/3 cores. Pattern 5 comprises 5% of tumor. Atypical small acinar proliferation in 1/13 cores. Cribiform Shaniqua 4 is present.  PSMA PET 6/3/24: 1. Primary prostate cancer with PSMA expression score 2, intermediate expression. 2. No PSMA-avid lymphadenopathy or bone metastases. 3. Right maxillary sinusitis.  He reports nocturia x 4 wtihout daytime frequency or urgency. He has HTN, no cardiac interventions. He reports poor erectile function and does not take any meds. Nearly daily BMs but occasional constipation and has hemorrhoids. He believes his last screening colonoscopy was 3-4 years ago and will verify. 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 Does not exercise much. He reports white coat syndrome and a needle phobia and insisted on his prostate biopsy with general anesthesia. He presents with his wife Joan (pediatrician and former breast ca pt of mine) to discuss therapy options.  IPSS/EPIC 8/11

## 2024-06-24 NOTE — VITALS
[Maximal Pain Intensity: 0/10] : 0/10 [90: Able to carry normal activity; minor signs or symptoms of disease.] : 90: Able to carry normal activity; minor signs or symptoms of disease.  [Date: ____________] : Patient's last distress assessment performed on [unfilled]. [6 - Distress Level] : Distress Level: 6 [Patient given social work contact information and resource sheet] : Patient was given social work contact information and resource sheet

## 2024-06-26 ENCOUNTER — NON-APPOINTMENT (OUTPATIENT)
Age: 69
End: 2024-06-26

## 2024-07-19 ENCOUNTER — APPOINTMENT (OUTPATIENT)
Dept: RADIATION ONCOLOGY | Facility: CLINIC | Age: 69
End: 2024-07-19
Payer: MEDICARE

## 2024-07-19 ENCOUNTER — NON-APPOINTMENT (OUTPATIENT)
Age: 69
End: 2024-07-19

## 2024-07-19 DIAGNOSIS — C61 MALIGNANT NEOPLASM OF PROSTATE: ICD-10-CM

## 2024-07-19 PROCEDURE — 99213 OFFICE O/P EST LOW 20 MIN: CPT

## 2024-07-28 ENCOUNTER — OUTPATIENT (OUTPATIENT)
Dept: OUTPATIENT SERVICES | Facility: HOSPITAL | Age: 69
LOS: 1 days | Discharge: HOME | End: 2024-07-28

## 2024-07-28 DIAGNOSIS — C61 MALIGNANT NEOPLASM OF PROSTATE: ICD-10-CM

## 2024-07-28 DIAGNOSIS — Z98.890 OTHER SPECIFIED POSTPROCEDURAL STATES: Chronic | ICD-10-CM

## 2024-07-29 ENCOUNTER — APPOINTMENT (OUTPATIENT)
Dept: HEMATOLOGY ONCOLOGY | Facility: CLINIC | Age: 69
End: 2024-07-29

## 2024-07-30 ENCOUNTER — RESULT REVIEW (OUTPATIENT)
Age: 69
End: 2024-07-30

## 2024-07-30 ENCOUNTER — APPOINTMENT (OUTPATIENT)
Dept: HEMATOLOGY ONCOLOGY | Facility: CLINIC | Age: 69
End: 2024-07-30
Payer: MEDICARE

## 2024-07-30 VITALS
TEMPERATURE: 98.2 F | DIASTOLIC BLOOD PRESSURE: 91 MMHG | HEART RATE: 57 BPM | WEIGHT: 198 LBS | SYSTOLIC BLOOD PRESSURE: 162 MMHG | OXYGEN SATURATION: 95 % | HEIGHT: 64 IN | BODY MASS INDEX: 33.8 KG/M2

## 2024-07-30 DIAGNOSIS — C61 MALIGNANT NEOPLASM OF PROSTATE: ICD-10-CM

## 2024-07-30 LAB
BASOPHILS # BLD AUTO: 0.06 K/UL — SIGNIFICANT CHANGE UP (ref 0–0.2)
BASOPHILS NFR BLD AUTO: 1 % — SIGNIFICANT CHANGE UP (ref 0–2)
EOSINOPHIL # BLD AUTO: 0.17 K/UL — SIGNIFICANT CHANGE UP (ref 0–0.5)
EOSINOPHIL NFR BLD AUTO: 2.7 % — SIGNIFICANT CHANGE UP (ref 0–6)
HCT VFR BLD CALC: 45.7 % — SIGNIFICANT CHANGE UP (ref 39–50)
HGB BLD-MCNC: 14.7 G/DL — SIGNIFICANT CHANGE UP (ref 13–17)
IMM GRANULOCYTES NFR BLD AUTO: 0.3 % — SIGNIFICANT CHANGE UP (ref 0–0.9)
LYMPHOCYTES # BLD AUTO: 1.19 K/UL — SIGNIFICANT CHANGE UP (ref 1–3.3)
LYMPHOCYTES # BLD AUTO: 18.9 % — SIGNIFICANT CHANGE UP (ref 13–44)
MCHC RBC-ENTMCNC: 26.3 PG — LOW (ref 27–34)
MCHC RBC-ENTMCNC: 32.2 GM/DL — SIGNIFICANT CHANGE UP (ref 32–36)
MCV RBC AUTO: 81.6 FL — SIGNIFICANT CHANGE UP (ref 80–100)
MONOCYTES # BLD AUTO: 0.44 K/UL — SIGNIFICANT CHANGE UP (ref 0–0.9)
MONOCYTES NFR BLD AUTO: 7 % — SIGNIFICANT CHANGE UP (ref 2–14)
NEUTROPHILS # BLD AUTO: 4.43 K/UL — SIGNIFICANT CHANGE UP (ref 1.8–7.4)
NEUTROPHILS NFR BLD AUTO: 70.1 % — SIGNIFICANT CHANGE UP (ref 43–77)
NRBC # BLD: 0 /100 WBCS — SIGNIFICANT CHANGE UP (ref 0–0)
PLATELET # BLD AUTO: 211 K/UL — SIGNIFICANT CHANGE UP (ref 150–400)
RBC # BLD: 5.6 M/UL — SIGNIFICANT CHANGE UP (ref 4.2–5.8)
RBC # FLD: 14.5 % — SIGNIFICANT CHANGE UP (ref 10.3–14.5)
WBC # BLD: 6.31 K/UL — SIGNIFICANT CHANGE UP (ref 3.8–10.5)
WBC # FLD AUTO: 6.31 K/UL — SIGNIFICANT CHANGE UP (ref 3.8–10.5)

## 2024-07-30 PROCEDURE — G2211 COMPLEX E/M VISIT ADD ON: CPT

## 2024-07-30 PROCEDURE — 99205 OFFICE O/P NEW HI 60 MIN: CPT

## 2024-07-30 RX ORDER — BICALUTAMIDE 50 MG/1
50 TABLET ORAL DAILY
Qty: 30 | Refills: 3 | Status: ACTIVE | COMMUNITY
Start: 2024-07-30 | End: 1900-01-01

## 2024-07-30 NOTE — HISTORY OF PRESENT ILLNESS
[de-identified] : Referred by: Dr. Raya Guerrero  PCP: Dr. Alexandr Braun  Diagnosis: prostate cancer  Wife - Yasmine Pompey -  [phone]   Mr. Pompey presented at age 69 in 2024 for evaluation of newly diagnosed prostate cancer. The patient has a medical history of HTN, HLD, and prostate cancer.  Surgical hx:  left inguinal hernia (2023) at , prostate biopsy.      Gerry was found to have an elevated PSA level to 6.09, seen by Urology, Dr. Samson 24. He underwent prostate MRI 24 which revealed a lesion in the left, posterior medial-lateral midgland peripheral zone, PIRADS 4.  S/p prostate biopsy revealed Adenocarcinoma, present in 9/13 cores- karlos 4+5=9 in 1/13 cores involving 40%, 20% and 60% of the 3/3 cores. Pattern 5 comprises 5% of tumor. Atypical small acinar proliferation in 1/13 cores. PSMA PET/CT 6/3/24 revealed primary prostate cancer, with PSMA expression score 2, intermediate expression. No PSMA-avid lymphadenopathy or bone metastases. He was evaluated by Dr. Raya Guerrero (radiation oncology) and enrolled in  009. His Decipher score resulted as .97 (high risk) and he will be randomized on the high-risk arm to 24 mo ADT + RT with or without apalatuamide.  He must begin RT within 12 weeks of starting ADT.   At today's visit he is feeling well. He has a normal appetite and his weight is stable. Has high blood pressure and feels his anti-hypertensive medications sometimes make him feel tired. Reports nocturia 3x per night. Denies dysuria.   Imaging reviewed: MRI prostate 24- Left, posterior medial-lateral (PZpm-pl), midgland, peripheral zone lesion as detailed above. BRANDON marked in DCAD. PIRADS 4 - High (clinically significant cancer is likely to be present) No extraprostatic extension, seminal vesicle invasion, or pelvic lymphadenopathy. PSMA PET/CT 6/3/24 revealed primary prostate cancer, with PSMA expression score 2, intermediate expression. No PSMA-avid lymphadenopathy or bone metastases.  Pathology reviewed: S/p prostate biopsy revealed Adenocarcinoma, present in 9/13 cores- Karlos Score 3+4=7 in 3/13 cores, Karlos Score 3+3=6 in 2/13 cores, Ashuelot Score 4+3=7 in 2/13 cores Ashuelot Score 4+4=8 in 1/13 cores, Ashuelot Score 4+5=9 in 1/13 cores involving 40%, 20% and 60% of the 3/3 cores. Pattern 5 comprises 5% of tumor. Atypical small acinar proliferation in 1/13 cores. Cribiform Ashuelot 4 is present.  Genetics reviewed: not done   HCM: - Colonoscopy:   --> due to repeat  - PSA testin.01 in 3/2024 - Lung cancer screen:  n/a - DEXA:  2024 - by Dr. Braun - was advised to begin calcium   SH: - Occupation:  Retired  - Living situation: Parents are from [facility], lives in [place] w/ wife and 2 sons (29 + 35 yo), his wife is a pediatrician - Smoking/etoh/illicits:  Never smoker, 2 drinks/week, no illicit. - Exercise:  none   Family History: - pat uncle - prostate cancer (late 70's) - mat 1st cousin - unknown primary

## 2024-07-30 NOTE — CONSULT LETTER
[Dear  ___] : Dear  [unfilled], [Consult Letter:] : I had the pleasure of evaluating your patient, [unfilled]. [Please see my note below.] : Please see my note below. [Consult Closing:] : Thank you very much for allowing me to participate in the care of this patient.  If you have any questions, please do not hesitate to contact me. [Sincerely,] : Sincerely, [FreeTextEntry2] : Dr. Raya Guerrero  [FreeTextEntry3] : Dr. Belkis Bradley [DrIgnacia  ___] : Dr. TRUONG

## 2024-07-30 NOTE — PHYSICAL EXAM
[Fully active, able to carry on all pre-disease performance without restriction] : Status 0 - Fully active, able to carry on all pre-disease performance without restriction [Normal] : affect appropriate [de-identified] : generally well appearing male, NAD, pleasant

## 2024-07-30 NOTE — RESULTS/DATA
[FreeTextEntry1] : #High risk prostate cancer- based on grade group 5 Gerry was found to have an elevated PSA level to 6.09, seen by Urology, Dr. Samson 2/28/24. He underwent prostate MRI 4/4/24 which revealed a lesion in the left, posterior medial-lateral midgland peripheral zone, PIRADS 4.  S/p prostate biopsy revealed Adenocarcinoma, present in 9/13 cores- karlos 4+5=9 in 1/13 cores involving 40%, 20% and 60% of the 3/3 cores. Pattern 5 comprises 5% of tumor. Atypical small acinar proliferation in 1/13 cores. PSMA PET/CT 6/3/24 revealed primary prostate cancer, with PSMA expression score 2, intermediate expression. No PSMA-avid lymphadenopathy or bone metastases.  At today's visit we discussed management of high risk prostate cancer. We discussed that the mainstay of therapy consists of androgen deprivation therapy (ADT) in combination with RT. Recommend initiation of bicalutamide 50mg PO daily at this time. After 2 weeks, will initiate leuprolide 22.5 mg SQ injection q3 months. Side effects of ADT were reviewed including fatigue, hot flashes, decreased libido, cardiovascular disease, muscle loss, decreased bone density, weight gain, dementia.  He was evaluated by Dr. Raya Guerrero (radiation oncology) and enrolled in [facility]-- Parallel Phase III Randomized Trials For High Risk Prostate Cancer Evaluating De-Intensification For Lower Genomic Risk and Intensification of Concurrent Therapy for Higher Genomic Risk with Radiation (Predict-RT*). His Decipher score resulted as .97 (high risk) and he will be randomized on the high-risk arm to 24 mo ADT + RT with or without apalatuamide.  He must begin RT within 12 weeks of starting ADT. If randomized to apalutamide, recommend apalutamide 240mg daily in combination with continuous ADT.  Common side effects of apalutamide include HTN (18-25%), peripheral edema (11%), rash (25%), hot flashes (14-23%), HLD, hyperglycemia, hyperkalemia, anemia, leukopenia, arthralgias, fracture.   He will RTC in 3 weeks to initiate lupron injections. Pending randomization on , research labs drawn today.   I personally have spent a total of 60 minutes of time on the date of this encounter reviewing test results, documenting findings, coordinating care and directly consulting with the patient and/or designated family member. Greater than 50% of the face to face encounter time was spent on counseling and/or coordination of care for high risk prostate cancer.

## 2024-07-31 LAB
ALBUMIN SERPL ELPH-MCNC: 4.7 G/DL — SIGNIFICANT CHANGE UP (ref 3.3–5)
ALP SERPL-CCNC: 91 U/L — SIGNIFICANT CHANGE UP (ref 40–120)
ALT FLD-CCNC: 32 U/L — SIGNIFICANT CHANGE UP (ref 10–45)
ANION GAP SERPL CALC-SCNC: 16 MMOL/L — SIGNIFICANT CHANGE UP (ref 5–17)
AST SERPL-CCNC: 30 U/L — SIGNIFICANT CHANGE UP (ref 10–40)
BILIRUB SERPL-MCNC: 0.3 MG/DL — SIGNIFICANT CHANGE UP (ref 0.2–1.2)
BUN SERPL-MCNC: 16 MG/DL — SIGNIFICANT CHANGE UP (ref 7–23)
CALCIUM SERPL-MCNC: 8.8 MG/DL — SIGNIFICANT CHANGE UP (ref 8.4–10.5)
CHLORIDE SERPL-SCNC: 105 MMOL/L — SIGNIFICANT CHANGE UP (ref 96–108)
CO2 SERPL-SCNC: 24 MMOL/L — SIGNIFICANT CHANGE UP (ref 22–31)
CREAT SERPL-MCNC: 1.04 MG/DL — SIGNIFICANT CHANGE UP (ref 0.5–1.3)
EGFR: 79 ML/MIN/1.73M2 — SIGNIFICANT CHANGE UP
GLUCOSE SERPL-MCNC: 100 MG/DL — HIGH (ref 70–99)
POTASSIUM SERPL-MCNC: 4.1 MMOL/L — SIGNIFICANT CHANGE UP (ref 3.5–5.3)
POTASSIUM SERPL-SCNC: 4.1 MMOL/L — SIGNIFICANT CHANGE UP (ref 3.5–5.3)
PROT SERPL-MCNC: 6.7 G/DL — SIGNIFICANT CHANGE UP (ref 6–8.3)
PSA SERPL-MCNC: 4.38 NG/ML — HIGH (ref 0–4)
SODIUM SERPL-SCNC: 144 MMOL/L — SIGNIFICANT CHANGE UP (ref 135–145)
TESTOST FREE+TOTAL PANEL SERPL-MCNC: 225 NG/DL — LOW (ref 300–740)

## 2024-08-01 LAB — TESTOST FREE SERPL-MCNC: 2.8 PG/ML — LOW (ref 5.9–27)

## 2024-08-05 ENCOUNTER — NON-APPOINTMENT (OUTPATIENT)
Age: 69
End: 2024-08-05

## 2024-08-23 ENCOUNTER — APPOINTMENT (OUTPATIENT)
Dept: INFUSION THERAPY | Facility: CLINIC | Age: 69
End: 2024-08-23

## 2024-08-23 VITALS
OXYGEN SATURATION: 96 % | BODY MASS INDEX: 33.12 KG/M2 | TEMPERATURE: 97.9 F | WEIGHT: 194 LBS | SYSTOLIC BLOOD PRESSURE: 122 MMHG | DIASTOLIC BLOOD PRESSURE: 76 MMHG | HEIGHT: 64 IN | HEART RATE: 58 BPM

## 2024-09-12 ENCOUNTER — NON-APPOINTMENT (OUTPATIENT)
Age: 69
End: 2024-09-12

## 2024-09-13 ENCOUNTER — APPOINTMENT (OUTPATIENT)
Dept: HEMATOLOGY ONCOLOGY | Facility: CLINIC | Age: 69
End: 2024-09-13
Payer: MEDICARE

## 2024-09-13 ENCOUNTER — RESULT REVIEW (OUTPATIENT)
Age: 69
End: 2024-09-13

## 2024-09-13 VITALS
HEIGHT: 64 IN | HEART RATE: 53 BPM | TEMPERATURE: 97.9 F | SYSTOLIC BLOOD PRESSURE: 134 MMHG | DIASTOLIC BLOOD PRESSURE: 78 MMHG | OXYGEN SATURATION: 96 % | WEIGHT: 194 LBS | BODY MASS INDEX: 33.12 KG/M2

## 2024-09-13 DIAGNOSIS — C61 MALIGNANT NEOPLASM OF PROSTATE: ICD-10-CM

## 2024-09-13 LAB
BASOPHILS # BLD AUTO: 0.06 K/UL — SIGNIFICANT CHANGE UP (ref 0–0.2)
BASOPHILS NFR BLD AUTO: 1.1 % — SIGNIFICANT CHANGE UP (ref 0–2)
EOSINOPHIL # BLD AUTO: 0.12 K/UL — SIGNIFICANT CHANGE UP (ref 0–0.5)
EOSINOPHIL NFR BLD AUTO: 2.1 % — SIGNIFICANT CHANGE UP (ref 0–6)
HCT VFR BLD CALC: 47.3 % — SIGNIFICANT CHANGE UP (ref 39–50)
HGB BLD-MCNC: 15.1 G/DL — SIGNIFICANT CHANGE UP (ref 13–17)
IMM GRANULOCYTES NFR BLD AUTO: 0.2 % — SIGNIFICANT CHANGE UP (ref 0–0.9)
LYMPHOCYTES # BLD AUTO: 1.3 K/UL — SIGNIFICANT CHANGE UP (ref 1–3.3)
LYMPHOCYTES # BLD AUTO: 22.9 % — SIGNIFICANT CHANGE UP (ref 13–44)
MCHC RBC-ENTMCNC: 25.7 PG — LOW (ref 27–34)
MCHC RBC-ENTMCNC: 31.9 GM/DL — LOW (ref 32–36)
MCV RBC AUTO: 80.6 FL — SIGNIFICANT CHANGE UP (ref 80–100)
MONOCYTES # BLD AUTO: 0.44 K/UL — SIGNIFICANT CHANGE UP (ref 0–0.9)
MONOCYTES NFR BLD AUTO: 7.8 % — SIGNIFICANT CHANGE UP (ref 2–14)
NEUTROPHILS # BLD AUTO: 3.74 K/UL — SIGNIFICANT CHANGE UP (ref 1.8–7.4)
NEUTROPHILS NFR BLD AUTO: 65.9 % — SIGNIFICANT CHANGE UP (ref 43–77)
NRBC # BLD: 0 /100 WBCS — SIGNIFICANT CHANGE UP (ref 0–0)
PLATELET # BLD AUTO: 218 K/UL — SIGNIFICANT CHANGE UP (ref 150–400)
RBC # BLD: 5.87 M/UL — HIGH (ref 4.2–5.8)
RBC # FLD: 13.7 % — SIGNIFICANT CHANGE UP (ref 10.3–14.5)
WBC # BLD: 5.67 K/UL — SIGNIFICANT CHANGE UP (ref 3.8–10.5)
WBC # FLD AUTO: 5.67 K/UL — SIGNIFICANT CHANGE UP (ref 3.8–10.5)

## 2024-09-13 PROCEDURE — G2211 COMPLEX E/M VISIT ADD ON: CPT

## 2024-09-13 PROCEDURE — 99215 OFFICE O/P EST HI 40 MIN: CPT

## 2024-09-13 NOTE — HISTORY OF PRESENT ILLNESS
[de-identified] : Referred by: Dr. Raya Guerrero  PCP: Dr. Alexandr Braun  Diagnosis: prostate cancer  Wife - Yasmine Pompey -  [phone]   Mr. Pompey presented at age 69 in 2024 for evaluation of newly diagnosed prostate cancer. The patient has a medical history of HTN, HLD, and prostate cancer.  Surgical hx:  left inguinal hernia (2023) at , prostate biopsy.      Gerry was found to have an elevated PSA level to 6.09, seen by Urology, Dr. Samson 24. He underwent prostate MRI 24 which revealed a lesion in the left, posterior medial-lateral midgland peripheral zone, PIRADS 4.  S/p prostate biopsy revealed Adenocarcinoma, present in 9/13 cores- karlos 4+5=9 in 1/13 cores involving 40%, 20% and 60% of the 3/3 cores. Pattern 5 comprises 5% of tumor. Atypical small acinar proliferation in 1/13 cores. PSMA PET/CT 6/3/24 revealed primary prostate cancer, with PSMA expression score 2, intermediate expression. No PSMA-avid lymphadenopathy or bone metastases. He was evaluated by Dr. Raya Guerrero (radiation oncology) and enrolled in  009. His Decipher score resulted as .97 (high risk) and he will be randomized on the high-risk arm to 24 mo ADT + RT with or without apalatuamide.  He must begin RT within 12 weeks of starting ADT.   At today's visit he is feeling well. He has a normal appetite and his weight is stable. Has high blood pressure and feels his anti-hypertensive medications sometimes make him feel tired. Reports nocturia 3x per night. Denies dysuria.   Imaging reviewed: MRI prostate 24- Left, posterior medial-lateral (PZpm-pl), midgland, peripheral zone lesion as detailed above. BRANDON marked in DCAD. PIRADS 4 - High (clinically significant cancer is likely to be present) No extraprostatic extension, seminal vesicle invasion, or pelvic lymphadenopathy. PSMA PET/CT 6/3/24 revealed primary prostate cancer, with PSMA expression score 2, intermediate expression. No PSMA-avid lymphadenopathy or bone metastases.  Pathology reviewed: S/p prostate biopsy revealed Adenocarcinoma, present in 9/13 cores- Karlos Score 3+4=7 in 3/13 cores, Karlos Score 3+3=6 in 2/13 cores, Waskom Score 4+3=7 in 2/13 cores Waskom Score 4+4=8 in 1/13 cores, Waskom Score 4+5=9 in 1/13 cores involving 40%, 20% and 60% of the 3/3 cores. Pattern 5 comprises 5% of tumor. Atypical small acinar proliferation in 1/13 cores. Cribiform Waskom 4 is present.  Genetics reviewed: not done   HCM: - Colonoscopy:   --> due to repeat  - PSA testin.01 in 3/2024 - Lung cancer screen:  n/a - DEXA:  2024 - by Dr. Braun - was advised to begin calcium   SH: - Occupation:  Retired  - Living situation: Parents are from [facility], lives in [place] w/ wife and 2 sons (29 + 35 yo), his wife is a pediatrician - Smoking/etoh/illicits:  Never smoker, 2 drinks/week, no illicit. - Exercise:  none   Family History: - pat uncle - prostate cancer (late 70's) - mat 1st cousin - unknown primary [de-identified] : Gerry presents today for follow up for high risk prostate cancer on 9/13/24.   Decipher score resulted as 0.97 (high risk). Gerry has been randomized to NRG-: PARALLEL PHASE III RANDOMIZED TRIALS FOR HIGH-RISK PROSTATE CANCER EVALUATING DE-INTENSIFICATION FOR LOWER GENOMIC RISK AND INTENSIFICATION OF CONCURRENT THERAPY FOR HIGHER GENOMIC RISK WITH RADIATION (PREDICT-RT*). Treatment Arm 4 - RT + ADT + Apalutamide Here today to discuss addition of apalutamide. He received his 1st lupron injection on 8/23/24. Next lupron due 11/15. He is generally feeling well today. Denies recent headaches, visual changes, balance issues, CP, cough, SOB, n/v/d, constipation, unintentional weight loss or new bone or back pain. He continues to have nocturia 3x per night. Had a recent colonoscopy this week which was normal per patient report.  Colonoscopy 9/10/24- normal, repeat in 5 years per patient (9/2029)  He is following with Dr. Guerrero and will initiate RT within 12 weeks of starting ADT.   Will start apalutamide 240mg daily in combination with continuous ADT. Common side effects of apalutamide include HTN (18-25%), peripheral edema (11%), rash (25%), hot flashes (14-23%), HLD, hyperglycemia, hyperkalemia, anemia, leukopenia, arthralgias, fracture. Apalutamide should be started within 14 days from randomization, at which time bicalutamide or other oral ADT will be discontinued.   CBC from today reviewed and within normal limits.

## 2024-09-13 NOTE — PHYSICAL EXAM
[Fully active, able to carry on all pre-disease performance without restriction] : Status 0 - Fully active, able to carry on all pre-disease performance without restriction [Normal] : affect appropriate [de-identified] : generally well appearing male, NAD, pleasant

## 2024-09-13 NOTE — CONSULT LETTER
[Dear  ___] : Dear  [unfilled], [Consult Letter:] : I had the pleasure of evaluating your patient, [unfilled]. [Please see my note below.] : Please see my note below. [Consult Closing:] : Thank you very much for allowing me to participate in the care of this patient.  If you have any questions, please do not hesitate to contact me. [Sincerely,] : Sincerely, [FreeTextEntry3] : Dr. Belkis Bradley [FreeTextEntry2] : Dr. Raya Guerrero

## 2024-09-13 NOTE — RESULTS/DATA
[FreeTextEntry1] : #High risk prostate cancer- based on grade group 5 Gerry was found to have an elevated PSA level to 6.09, seen by Urology, Dr. Samson 2/28/24. He underwent prostate MRI 4/4/24 which revealed a lesion in the left, posterior medial-lateral midgland peripheral zone, PIRADS 4.  S/p prostate biopsy revealed Adenocarcinoma, present in 9/13 cores- karlos 4+5=9 in 1/13 cores involving 40%, 20% and 60% of the 3/3 cores. Pattern 5 comprises 5% of tumor. Atypical small acinar proliferation in 1/13 cores. PSMA PET/CT 6/3/24 revealed primary prostate cancer, with PSMA expression score 2, intermediate expression. No PSMA-avid lymphadenopathy or bone metastases.  At today's visit we discussed management of high risk prostate cancer. We discussed that the mainstay of therapy consists of androgen deprivation therapy (ADT) in combination with RT. Recommend initiation of bicalutamide 50mg PO daily at this time. After 2 weeks, will initiate leuprolide 22.5 mg SQ injection q3 months. Side effects of ADT were reviewed including fatigue, hot flashes, decreased libido, cardiovascular disease, muscle loss, decreased bone density, weight gain, dementia.  He was evaluated by Dr. Raya Guerrero (radiation oncology) and enrolled in NRG-- Parallel Phase III Randomized Trials For High Risk Prostate Cancer Evaluating De-Intensification For Lower Genomic Risk and Intensification of Concurrent Therapy for Higher Genomic Risk with Radiation (Predict-RT*).  He received his 1st lupron injection on 8/23/24. Next lupron due 11/15. Decipher score resulted as 0.97 (high risk). Consented to NRG-. Gerry has been randomized to treatment Arm 4 - RT + ADT + Apalutamide. Here today to discuss addition of apalutamide. Will start apalutamide 240mg daily in combination with continuous ADT. Common side effects of apalutamide include HTN (18-25%), peripheral edema (11%), rash (25%), hot flashes (14-23%), HLD, hyperglycemia, hyperkalemia, anemia, leukopenia, arthralgias, fracture. Apalutamide should be started within 14 days from randomization, at which time bicalutamide or other oral ADT will be discontinued.  Will start apalutamide on 9/18/24 and STOP bicalutamide at that time. Continue lupron injections q 3 months.  Pending RT w/ Dr. Guerrero within 12 weeks of starting ADT.  Colonoscopy 9/10/24- normal, repeat in 5 years.  Trend PSA, testosterone levels q 3 months.   Upcoming visits:  LAB only 10/2/24 Lupron + MD visit 11/15/24 RESEARCH VISIT 12/17/24  I personally have spent a total of 45 minutes of time on the date of this encounter reviewing test results, documenting findings, coordinating care and directly consulting with the patient and/or designated family member. Greater than 50% of the face to face encounter time was spent on counseling and/or coordination of care for high risk prostate cancer.

## 2024-09-16 LAB
ALBUMIN SERPL ELPH-MCNC: 4.7 G/DL
ALP BLD-CCNC: 95 U/L
ALT SERPL-CCNC: 30 U/L
ANION GAP SERPL CALC-SCNC: 13 MMOL/L
AST SERPL-CCNC: 22 U/L
BILIRUB SERPL-MCNC: 0.5 MG/DL
BUN SERPL-MCNC: 14 MG/DL
CALCIUM SERPL-MCNC: 9.6 MG/DL
CHLORIDE SERPL-SCNC: 102 MMOL/L
CO2 SERPL-SCNC: 26 MMOL/L
CREAT SERPL-MCNC: 1.01 MG/DL
EGFR: 82 ML/MIN/1.73M2
GLUCOSE SERPL-MCNC: 96 MG/DL
POTASSIUM SERPL-SCNC: 4.2 MMOL/L
PROT SERPL-MCNC: 7.2 G/DL
PSA FREE FLD-MCNC: 12 %
PSA FREE SERPL-MCNC: 0.11 NG/ML
PSA SERPL-MCNC: 0.89 NG/ML
SODIUM SERPL-SCNC: 141 MMOL/L
TESTOST FREE SERPL-MCNC: 1.3 PG/ML
TESTOST SERPL-MCNC: 26.5 NG/DL
TSH SERPL-ACNC: 0.64 UIU/ML

## 2024-09-18 ENCOUNTER — OUTPATIENT (OUTPATIENT)
Dept: OUTPATIENT SERVICES | Facility: HOSPITAL | Age: 69
LOS: 1 days | Discharge: HOME | End: 2024-09-18

## 2024-09-18 DIAGNOSIS — Z98.890 OTHER SPECIFIED POSTPROCEDURAL STATES: Chronic | ICD-10-CM

## 2024-09-18 DIAGNOSIS — C61 MALIGNANT NEOPLASM OF PROSTATE: ICD-10-CM

## 2024-09-30 DIAGNOSIS — R97.20 ELEVATED PROSTATE, SPECIFIC ANTIGEN [PSA]: ICD-10-CM

## 2024-09-30 DIAGNOSIS — C61 MALIGNANT NEOPLASM OF PROSTATE: ICD-10-CM

## 2024-10-02 ENCOUNTER — RESULT REVIEW (OUTPATIENT)
Age: 69
End: 2024-10-02

## 2024-10-02 ENCOUNTER — APPOINTMENT (OUTPATIENT)
Dept: HEMATOLOGY ONCOLOGY | Facility: CLINIC | Age: 69
End: 2024-10-02

## 2024-10-02 LAB
BASOPHILS # BLD AUTO: 0.07 K/UL — SIGNIFICANT CHANGE UP (ref 0–0.2)
BASOPHILS NFR BLD AUTO: 1.4 % — SIGNIFICANT CHANGE UP (ref 0–2)
EOSINOPHIL # BLD AUTO: 0.15 K/UL — SIGNIFICANT CHANGE UP (ref 0–0.5)
EOSINOPHIL NFR BLD AUTO: 3.1 % — SIGNIFICANT CHANGE UP (ref 0–6)
HCT VFR BLD CALC: 47.3 % — SIGNIFICANT CHANGE UP (ref 39–50)
HGB BLD-MCNC: 15.1 G/DL — SIGNIFICANT CHANGE UP (ref 13–17)
IMM GRANULOCYTES NFR BLD AUTO: 0.4 % — SIGNIFICANT CHANGE UP (ref 0–0.9)
LYMPHOCYTES # BLD AUTO: 1.18 K/UL — SIGNIFICANT CHANGE UP (ref 1–3.3)
LYMPHOCYTES # BLD AUTO: 24.1 % — SIGNIFICANT CHANGE UP (ref 13–44)
MCHC RBC-ENTMCNC: 25.8 PG — LOW (ref 27–34)
MCHC RBC-ENTMCNC: 31.9 GM/DL — LOW (ref 32–36)
MCV RBC AUTO: 80.9 FL — SIGNIFICANT CHANGE UP (ref 80–100)
MONOCYTES # BLD AUTO: 0.36 K/UL — SIGNIFICANT CHANGE UP (ref 0–0.9)
MONOCYTES NFR BLD AUTO: 7.4 % — SIGNIFICANT CHANGE UP (ref 2–14)
NEUTROPHILS # BLD AUTO: 3.11 K/UL — SIGNIFICANT CHANGE UP (ref 1.8–7.4)
NEUTROPHILS NFR BLD AUTO: 63.6 % — SIGNIFICANT CHANGE UP (ref 43–77)
NRBC # BLD: 0 /100 WBCS — SIGNIFICANT CHANGE UP (ref 0–0)
PLATELET # BLD AUTO: 196 K/UL — SIGNIFICANT CHANGE UP (ref 150–400)
RBC # BLD: 5.85 M/UL — HIGH (ref 4.2–5.8)
RBC # FLD: 14.1 % — SIGNIFICANT CHANGE UP (ref 10.3–14.5)
WBC # BLD: 4.89 K/UL — SIGNIFICANT CHANGE UP (ref 3.8–10.5)
WBC # FLD AUTO: 4.89 K/UL — SIGNIFICANT CHANGE UP (ref 3.8–10.5)

## 2024-10-07 LAB
ALBUMIN SERPL ELPH-MCNC: 4.8 G/DL
ALP BLD-CCNC: 85 U/L
ALT SERPL-CCNC: 26 U/L
ANION GAP SERPL CALC-SCNC: 17 MMOL/L
AST SERPL-CCNC: 24 U/L
BILIRUB SERPL-MCNC: 0.2 MG/DL
BUN SERPL-MCNC: 16 MG/DL
CALCIUM SERPL-MCNC: 9.9 MG/DL
CHLORIDE SERPL-SCNC: 105 MMOL/L
CO2 SERPL-SCNC: 26 MMOL/L
CREAT SERPL-MCNC: 1.11 MG/DL
EGFR: 73 ML/MIN/1.73M2
GLUCOSE SERPL-MCNC: 109 MG/DL
POTASSIUM SERPL-SCNC: 5.2 MMOL/L
PROT SERPL-MCNC: 7.6 G/DL
PSA FREE FLD-MCNC: 27 %
PSA FREE SERPL-MCNC: 0.1 NG/ML
PSA SERPL-MCNC: 0.37 NG/ML
SODIUM SERPL-SCNC: 148 MMOL/L
TESTOST FREE SERPL-MCNC: 1.9 PG/ML
TESTOST SERPL-MCNC: 16.7 NG/DL

## 2024-10-08 ENCOUNTER — NON-APPOINTMENT (OUTPATIENT)
Age: 69
End: 2024-10-08

## 2024-10-22 ENCOUNTER — APPOINTMENT (OUTPATIENT)
Dept: RADIATION ONCOLOGY | Facility: CLINIC | Age: 69
End: 2024-10-22
Payer: MEDICARE

## 2024-10-22 DIAGNOSIS — C61 MALIGNANT NEOPLASM OF PROSTATE: ICD-10-CM

## 2024-10-22 PROCEDURE — 77333 RADIATION TREATMENT AID(S): CPT

## 2024-10-22 PROCEDURE — 77263 THER RADIOLOGY TX PLNG CPLX: CPT

## 2024-10-22 PROCEDURE — 99212 OFFICE O/P EST SF 10 MIN: CPT | Mod: 25

## 2024-10-28 PROCEDURE — 77300 RADIATION THERAPY DOSE PLAN: CPT

## 2024-10-28 PROCEDURE — 77338 DESIGN MLC DEVICE FOR IMRT: CPT

## 2024-10-28 PROCEDURE — 77301 RADIOTHERAPY DOSE PLAN IMRT: CPT

## 2024-11-06 PROCEDURE — G6015: CPT

## 2024-11-06 PROCEDURE — 77427 RADIATION TX MANAGEMENT X5: CPT

## 2024-11-06 PROCEDURE — 77014: CPT

## 2024-11-07 PROCEDURE — 77014: CPT

## 2024-11-07 PROCEDURE — G6015: CPT

## 2024-11-08 ENCOUNTER — NON-APPOINTMENT (OUTPATIENT)
Age: 69
End: 2024-11-08

## 2024-11-08 PROCEDURE — G6015: CPT

## 2024-11-08 PROCEDURE — 77014: CPT

## 2024-11-11 PROCEDURE — 77014: CPT

## 2024-11-11 PROCEDURE — G6015: CPT

## 2024-11-12 ENCOUNTER — NON-APPOINTMENT (OUTPATIENT)
Age: 69
End: 2024-11-12

## 2024-11-12 PROCEDURE — 77014: CPT

## 2024-11-12 PROCEDURE — 77336 RADIATION PHYSICS CONSULT: CPT

## 2024-11-12 PROCEDURE — G6015: CPT

## 2024-11-13 PROCEDURE — 77014: CPT

## 2024-11-13 PROCEDURE — G6015: CPT

## 2024-11-13 PROCEDURE — 77427 RADIATION TX MANAGEMENT X5: CPT

## 2024-11-14 PROCEDURE — 77014: CPT

## 2024-11-14 PROCEDURE — G6015: CPT

## 2024-11-15 ENCOUNTER — RESULT REVIEW (OUTPATIENT)
Age: 69
End: 2024-11-15

## 2024-11-15 ENCOUNTER — APPOINTMENT (OUTPATIENT)
Dept: INFUSION THERAPY | Facility: CLINIC | Age: 69
End: 2024-11-15

## 2024-11-15 ENCOUNTER — APPOINTMENT (OUTPATIENT)
Dept: HEMATOLOGY ONCOLOGY | Facility: CLINIC | Age: 69
End: 2024-11-15
Payer: MEDICARE

## 2024-11-15 VITALS
BODY MASS INDEX: 32.78 KG/M2 | SYSTOLIC BLOOD PRESSURE: 164 MMHG | HEIGHT: 64 IN | HEART RATE: 71 BPM | WEIGHT: 192 LBS | DIASTOLIC BLOOD PRESSURE: 93 MMHG | TEMPERATURE: 96.7 F | OXYGEN SATURATION: 96 %

## 2024-11-15 DIAGNOSIS — C61 MALIGNANT NEOPLASM OF PROSTATE: ICD-10-CM

## 2024-11-15 LAB
BASOPHILS # BLD AUTO: 0.02 K/UL — SIGNIFICANT CHANGE UP (ref 0–0.2)
BASOPHILS NFR BLD AUTO: 0.6 % — SIGNIFICANT CHANGE UP (ref 0–2)
EOSINOPHIL # BLD AUTO: 0.06 K/UL — SIGNIFICANT CHANGE UP (ref 0–0.5)
EOSINOPHIL NFR BLD AUTO: 1.7 % — SIGNIFICANT CHANGE UP (ref 0–6)
HCT VFR BLD CALC: 44.9 % — SIGNIFICANT CHANGE UP (ref 39–50)
HGB BLD-MCNC: 14.8 G/DL — SIGNIFICANT CHANGE UP (ref 13–17)
IMM GRANULOCYTES NFR BLD AUTO: 0.3 % — SIGNIFICANT CHANGE UP (ref 0–0.9)
LYMPHOCYTES # BLD AUTO: 0.54 K/UL — LOW (ref 1–3.3)
LYMPHOCYTES # BLD AUTO: 15.1 % — SIGNIFICANT CHANGE UP (ref 13–44)
MCHC RBC-ENTMCNC: 26.8 PG — LOW (ref 27–34)
MCHC RBC-ENTMCNC: 33 G/DL — SIGNIFICANT CHANGE UP (ref 32–36)
MCV RBC AUTO: 81.3 FL — SIGNIFICANT CHANGE UP (ref 80–100)
MONOCYTES # BLD AUTO: 0.26 K/UL — SIGNIFICANT CHANGE UP (ref 0–0.9)
MONOCYTES NFR BLD AUTO: 7.3 % — SIGNIFICANT CHANGE UP (ref 2–14)
NEUTROPHILS # BLD AUTO: 2.68 K/UL — SIGNIFICANT CHANGE UP (ref 1.8–7.4)
NEUTROPHILS NFR BLD AUTO: 75 % — SIGNIFICANT CHANGE UP (ref 43–77)
NRBC # BLD: 0 /100 WBCS — SIGNIFICANT CHANGE UP (ref 0–0)
PLATELET # BLD AUTO: 195 K/UL — SIGNIFICANT CHANGE UP (ref 150–400)
PSA FREE FLD-MCNC: 33 %
PSA FREE SERPL-MCNC: 0.03 NG/ML
PSA SERPL-MCNC: 0.08 NG/ML
RBC # BLD: 5.52 M/UL — SIGNIFICANT CHANGE UP (ref 4.2–5.8)
RBC # FLD: 14.3 % — SIGNIFICANT CHANGE UP (ref 10.3–14.5)
WBC # BLD: 3.57 K/UL — LOW (ref 3.8–10.5)
WBC # FLD AUTO: 3.57 K/UL — LOW (ref 3.8–10.5)

## 2024-11-15 PROCEDURE — 99214 OFFICE O/P EST MOD 30 MIN: CPT

## 2024-11-15 PROCEDURE — G6015: CPT

## 2024-11-15 PROCEDURE — 77014: CPT

## 2024-11-15 PROCEDURE — G2211 COMPLEX E/M VISIT ADD ON: CPT

## 2024-11-16 ENCOUNTER — EMERGENCY (EMERGENCY)
Facility: HOSPITAL | Age: 69
LOS: 0 days | Discharge: HOME | End: 2024-11-16
Attending: EMERGENCY MEDICINE
Payer: MEDICARE

## 2024-11-16 VITALS
TEMPERATURE: 98 F | DIASTOLIC BLOOD PRESSURE: 95 MMHG | RESPIRATION RATE: 18 BRPM | OXYGEN SATURATION: 98 % | SYSTOLIC BLOOD PRESSURE: 168 MMHG | HEART RATE: 62 BPM

## 2024-11-16 VITALS — HEIGHT: 64 IN

## 2024-11-16 DIAGNOSIS — Z98.890 OTHER SPECIFIED POSTPROCEDURAL STATES: Chronic | ICD-10-CM

## 2024-11-16 DIAGNOSIS — I10 ESSENTIAL (PRIMARY) HYPERTENSION: ICD-10-CM

## 2024-11-16 DIAGNOSIS — Z88.0 ALLERGY STATUS TO PENICILLIN: ICD-10-CM

## 2024-11-16 LAB
ALBUMIN SERPL ELPH-MCNC: 4 G/DL — SIGNIFICANT CHANGE UP (ref 3.3–5)
ALP SERPL-CCNC: 68 U/L — SIGNIFICANT CHANGE UP (ref 40–120)
ALT FLD-CCNC: 27 U/L — SIGNIFICANT CHANGE UP (ref 12–78)
ANION GAP SERPL CALC-SCNC: 4 MMOL/L — LOW (ref 5–17)
APPEARANCE UR: CLEAR — SIGNIFICANT CHANGE UP
AST SERPL-CCNC: 15 U/L — SIGNIFICANT CHANGE UP (ref 15–37)
BASOPHILS # BLD AUTO: 0.02 K/UL — SIGNIFICANT CHANGE UP (ref 0–0.2)
BASOPHILS NFR BLD AUTO: 0.5 % — SIGNIFICANT CHANGE UP (ref 0–2)
BILIRUB SERPL-MCNC: 0.4 MG/DL — SIGNIFICANT CHANGE UP (ref 0.2–1.2)
BILIRUB UR-MCNC: NEGATIVE — SIGNIFICANT CHANGE UP
BUN SERPL-MCNC: 16 MG/DL — SIGNIFICANT CHANGE UP (ref 7–23)
CALCIUM SERPL-MCNC: 9.3 MG/DL — SIGNIFICANT CHANGE UP (ref 8.5–10.1)
CHLORIDE SERPL-SCNC: 109 MMOL/L — HIGH (ref 96–108)
CO2 SERPL-SCNC: 27 MMOL/L — SIGNIFICANT CHANGE UP (ref 22–31)
COLOR SPEC: YELLOW — SIGNIFICANT CHANGE UP
CREAT SERPL-MCNC: 1.18 MG/DL — SIGNIFICANT CHANGE UP (ref 0.5–1.3)
DIFF PNL FLD: NEGATIVE — SIGNIFICANT CHANGE UP
EGFR: 68 ML/MIN/1.73M2 — SIGNIFICANT CHANGE UP
EOSINOPHIL # BLD AUTO: 0.08 K/UL — SIGNIFICANT CHANGE UP (ref 0–0.5)
EOSINOPHIL NFR BLD AUTO: 2.1 % — SIGNIFICANT CHANGE UP (ref 0–6)
GLUCOSE SERPL-MCNC: 121 MG/DL — HIGH (ref 70–99)
GLUCOSE UR QL: NEGATIVE MG/DL — SIGNIFICANT CHANGE UP
HCT VFR BLD CALC: 46.8 % — SIGNIFICANT CHANGE UP (ref 39–50)
HGB BLD-MCNC: 14.8 G/DL — SIGNIFICANT CHANGE UP (ref 13–17)
IMM GRANULOCYTES NFR BLD AUTO: 0.3 % — SIGNIFICANT CHANGE UP (ref 0–0.9)
KETONES UR-MCNC: NEGATIVE MG/DL — SIGNIFICANT CHANGE UP
LEUKOCYTE ESTERASE UR-ACNC: NEGATIVE — SIGNIFICANT CHANGE UP
LYMPHOCYTES # BLD AUTO: 0.54 K/UL — LOW (ref 1–3.3)
LYMPHOCYTES # BLD AUTO: 13.9 % — SIGNIFICANT CHANGE UP (ref 13–44)
MCHC RBC-ENTMCNC: 26.4 PG — LOW (ref 27–34)
MCHC RBC-ENTMCNC: 31.6 G/DL — LOW (ref 32–36)
MCV RBC AUTO: 83.4 FL — SIGNIFICANT CHANGE UP (ref 80–100)
MONOCYTES # BLD AUTO: 0.26 K/UL — SIGNIFICANT CHANGE UP (ref 0–0.9)
MONOCYTES NFR BLD AUTO: 6.7 % — SIGNIFICANT CHANGE UP (ref 2–14)
NEUTROPHILS # BLD AUTO: 2.97 K/UL — SIGNIFICANT CHANGE UP (ref 1.8–7.4)
NEUTROPHILS NFR BLD AUTO: 76.5 % — SIGNIFICANT CHANGE UP (ref 43–77)
NITRITE UR-MCNC: NEGATIVE — SIGNIFICANT CHANGE UP
PH UR: 6.5 — SIGNIFICANT CHANGE UP (ref 5–8)
PLATELET # BLD AUTO: 200 K/UL — SIGNIFICANT CHANGE UP (ref 150–400)
POTASSIUM SERPL-MCNC: 3.7 MMOL/L — SIGNIFICANT CHANGE UP (ref 3.5–5.3)
POTASSIUM SERPL-SCNC: 3.7 MMOL/L — SIGNIFICANT CHANGE UP (ref 3.5–5.3)
PROT SERPL-MCNC: 7.6 GM/DL — SIGNIFICANT CHANGE UP (ref 6–8.3)
PROT UR-MCNC: NEGATIVE MG/DL — SIGNIFICANT CHANGE UP
RBC # BLD: 5.61 M/UL — SIGNIFICANT CHANGE UP (ref 4.2–5.8)
RBC # FLD: 14.4 % — SIGNIFICANT CHANGE UP (ref 10.3–14.5)
SODIUM SERPL-SCNC: 140 MMOL/L — SIGNIFICANT CHANGE UP (ref 135–145)
SP GR SPEC: 1.02 — SIGNIFICANT CHANGE UP (ref 1–1.03)
TROPONIN I, HIGH SENSITIVITY RESULT: 5.44 NG/L — SIGNIFICANT CHANGE UP
UROBILINOGEN FLD QL: 0.2 MG/DL — SIGNIFICANT CHANGE UP (ref 0.2–1)
WBC # BLD: 3.88 K/UL — SIGNIFICANT CHANGE UP (ref 3.8–10.5)
WBC # FLD AUTO: 3.88 K/UL — SIGNIFICANT CHANGE UP (ref 3.8–10.5)

## 2024-11-16 PROCEDURE — 36415 COLL VENOUS BLD VENIPUNCTURE: CPT

## 2024-11-16 PROCEDURE — 85025 COMPLETE CBC W/AUTO DIFF WBC: CPT

## 2024-11-16 PROCEDURE — 99284 EMERGENCY DEPT VISIT MOD MDM: CPT | Mod: 25

## 2024-11-16 PROCEDURE — 70450 CT HEAD/BRAIN W/O DYE: CPT | Mod: MC

## 2024-11-16 PROCEDURE — 70450 CT HEAD/BRAIN W/O DYE: CPT | Mod: 26,MC

## 2024-11-16 PROCEDURE — 99284 EMERGENCY DEPT VISIT MOD MDM: CPT

## 2024-11-16 PROCEDURE — 36000 PLACE NEEDLE IN VEIN: CPT

## 2024-11-16 PROCEDURE — 80053 COMPREHEN METABOLIC PANEL: CPT

## 2024-11-16 PROCEDURE — 84484 ASSAY OF TROPONIN QUANT: CPT

## 2024-11-16 PROCEDURE — 81003 URINALYSIS AUTO W/O SCOPE: CPT

## 2024-11-16 RX ORDER — LABETALOL HCL 200 MG
100 TABLET ORAL ONCE
Refills: 0 | Status: COMPLETED | OUTPATIENT
Start: 2024-11-16 | End: 2024-11-16

## 2024-11-16 RX ORDER — LISINOPRIL 40 MG
40 TABLET ORAL ONCE
Refills: 0 | Status: COMPLETED | OUTPATIENT
Start: 2024-11-16 | End: 2024-11-16

## 2024-11-16 RX ADMIN — Medication 100 MILLIGRAM(S): at 06:38

## 2024-11-16 NOTE — ED ADULT NURSE NOTE - CHIEF COMPLAINT QUOTE
Patient ambulatory to the ER c/o high blood pressure that was first noticed yesterday at 5pm. Patient reports feeling malaise and headache yesterday and took his blood pressure; BP found to be 190/110. Patient states that there has been a lot of variation in his blood pressure since then and the headache has improved but is still there. Took all doses of blood pressure meds today. HX of prostate CA and is currently getting radiation and taking an immunosuppressant. 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

## 2024-11-16 NOTE — ED PROVIDER NOTE - PATIENT PORTAL LINK FT
You can access the FollowMyHealth Patient Portal offered by [facility] by registering at the following website: [email or web address]. By joining [employer]’s FollowMyHealth portal, you will also be able to view your health information using other applications (apps) compatible with our system.

## 2024-11-16 NOTE — ED ADULT NURSE NOTE - NSFALLUNIVINTERV_ED_ALL_ED
Bed/Stretcher in lowest position, wheels locked, appropriate side rails in place/Call bell, personal items and telephone in reach/Instruct patient to call for assistance before getting out of bed/chair/stretcher/Non-slip footwear applied when patient is off stretcher/[place] to call system/Physically safe environment - no spills, clutter or unnecessary equipment/Purposeful proactive rounding/Room/bathroom lighting operational, light cord in reach

## 2024-11-16 NOTE — ED ADULT TRIAGE NOTE - CHIEF COMPLAINT QUOTE
Patient ambulatory to the ER c/o high blood pressure that was first noticed yesterday at 5pm. Patient reports feeling malaise and headache yesterday and took his blood pressure; BP found to be 190/110. Patient states that there has been a lot of variation in his blood pressure since then and the headache has improved but is still there. Took all doses of blood pressure meds today. PMH of prostate CA and is currently getting radiation and taking an immunosuppressant. 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 Patient ambulatory to the ER c/o high blood pressure that was first noticed yesterday at 5pm. Patient reports feeling malaise and headache yesterday and took his blood pressure; BP found to be 190/110. Patient states that there has been a lot of variation in his blood pressure since then and the headache has improved but is still there. Took all doses of blood pressure meds today. HX of prostate CA and is currently getting radiation and taking an immunosuppressant. 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

## 2024-11-16 NOTE — ED PROVIDER NOTE - IV ALTEPLASE EXCL ABS HIDDEN
Vaccine Information Statement(s) or the Emergency Use Authorization was given today. This has been reviewed, questions answered, and verbal consent given by Patient for injection(s) and administration of Influenza (Inactivated).        Patient tolerated without incident. See immunization grid for documentation.   show

## 2024-11-16 NOTE — ED ADULT NURSE NOTE - OBJECTIVE STATEMENT
Patient ambulatory to the ER c/o high blood pressure that was first noticed yesterday at 5pm. Patient reports feeling malaise and headache yesterday and took his blood pressure; BP found to be 190/110. Patient states that there has been a lot of variation in his blood pressure since then and the headache has since resolved. Took all doses of blood pressure meds today. HX of prostate CA and is currently getting radiation and taking an immunosuppressant. 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 Pt denies CP/SOB, N/V/D, fever/chills, dizziness, pain. no other complaints at this time. safety and comfort measures maintained. md at bedside

## 2024-11-16 NOTE — ED PROVIDER NOTE - NSFOLLOWUPINSTRUCTIONS_ED_ALL_ED_FT
Please follow-up with your primary doctor.  Please take your blood pressure medication as prescribed.  Return to ED if worsening headache confusion or other concerning symptoms.

## 2024-11-16 NOTE — ED PROVIDER NOTE - OBJECTIVE STATEMENT
69-year-old male past medical history of prostate cancer hypertension, will, presents with elevated blood pressure.  Patient also had headache earlier today which resolved on arrival.  No numbness tingling weakness.  No fevers or chills.

## 2024-11-16 NOTE — ED PROVIDER NOTE - CLINICAL SUMMARY MEDICAL DECISION MAKING FREE TEXT BOX
Well-appearing patient with uncontrolled hypertension will obtain CT head basic labs and reassess.    No acute actionable findings on labs or CAT scan okay for discharge with PCP follow-up.

## 2024-11-18 LAB
ALBUMIN SERPL ELPH-MCNC: 4.5 G/DL
ALP BLD-CCNC: 73 U/L
ALT SERPL-CCNC: 21 U/L
ANION GAP SERPL CALC-SCNC: 12 MMOL/L
AST SERPL-CCNC: 20 U/L
BILIRUB SERPL-MCNC: 0.3 MG/DL
BUN SERPL-MCNC: 17 MG/DL
CALCIUM SERPL-MCNC: 10 MG/DL
CHLORIDE SERPL-SCNC: 105 MMOL/L
CO2 SERPL-SCNC: 25 MMOL/L
CREAT SERPL-MCNC: 1.13 MG/DL
EGFR: 71 ML/MIN/1.73M2
GLUCOSE SERPL-MCNC: 102 MG/DL
POTASSIUM SERPL-SCNC: 4.5 MMOL/L
PROT SERPL-MCNC: 7.2 G/DL
SODIUM SERPL-SCNC: 142 MMOL/L

## 2024-11-18 PROCEDURE — 77014: CPT

## 2024-11-18 PROCEDURE — G6015: CPT

## 2024-11-19 ENCOUNTER — NON-APPOINTMENT (OUTPATIENT)
Age: 69
End: 2024-11-19

## 2024-11-19 VITALS
OXYGEN SATURATION: 98 % | BODY MASS INDEX: 32.1 KG/M2 | RESPIRATION RATE: 16 BRPM | HEART RATE: 64 BPM | HEIGHT: 64 IN | WEIGHT: 188 LBS

## 2024-11-19 LAB
TESTOST FREE SERPL-MCNC: 0.5 PG/ML
TESTOST SERPL-MCNC: 12.8 NG/DL

## 2024-11-19 PROCEDURE — 77014: CPT

## 2024-11-19 PROCEDURE — G6015: CPT

## 2024-11-19 PROCEDURE — 77336 RADIATION PHYSICS CONSULT: CPT

## 2024-11-19 RX ORDER — OXYBUTYNIN CHLORIDE 5 MG/1
5 TABLET, EXTENDED RELEASE ORAL
Qty: 30 | Refills: 5 | Status: ACTIVE | COMMUNITY
Start: 2024-11-19 | End: 1900-01-01

## 2024-11-20 PROCEDURE — 77014: CPT

## 2024-11-20 PROCEDURE — G6015: CPT

## 2024-11-21 PROCEDURE — G6015: CPT

## 2024-11-21 PROCEDURE — 77014: CPT

## 2024-11-22 PROCEDURE — G6015: CPT

## 2024-11-22 PROCEDURE — 77014: CPT

## 2024-11-24 PROCEDURE — G6015: CPT

## 2024-11-24 PROCEDURE — 77014: CPT

## 2024-11-25 PROCEDURE — G6015: CPT

## 2024-11-25 PROCEDURE — 77336 RADIATION PHYSICS CONSULT: CPT

## 2024-11-25 PROCEDURE — 77014: CPT

## 2024-11-26 ENCOUNTER — NON-APPOINTMENT (OUTPATIENT)
Age: 69
End: 2024-11-26

## 2024-11-26 PROCEDURE — G6015: CPT

## 2024-11-26 PROCEDURE — 77014: CPT

## 2024-11-26 RX ORDER — TAMSULOSIN HYDROCHLORIDE 0.4 MG/1
0.4 CAPSULE ORAL
Qty: 30 | Refills: 5 | Status: ACTIVE | COMMUNITY
Start: 2024-11-26 | End: 1900-01-01

## 2024-12-02 PROCEDURE — G6015: CPT

## 2024-12-02 PROCEDURE — 77427 RADIATION TX MANAGEMENT X5: CPT

## 2024-12-02 PROCEDURE — 77014: CPT

## 2024-12-03 PROCEDURE — 77014: CPT

## 2024-12-03 PROCEDURE — G6015: CPT

## 2024-12-04 ENCOUNTER — NON-APPOINTMENT (OUTPATIENT)
Age: 69
End: 2024-12-04

## 2024-12-04 VITALS
WEIGHT: 189 LBS | BODY MASS INDEX: 32.27 KG/M2 | HEIGHT: 64 IN | RESPIRATION RATE: 18 BRPM | DIASTOLIC BLOOD PRESSURE: 100 MMHG | HEART RATE: 70 BPM | SYSTOLIC BLOOD PRESSURE: 185 MMHG

## 2024-12-04 PROCEDURE — G6015: CPT

## 2024-12-04 PROCEDURE — 77014: CPT

## 2024-12-04 RX ORDER — TAMSULOSIN HYDROCHLORIDE 0.4 MG/1
0.4 CAPSULE ORAL
Qty: 60 | Refills: 5 | Status: ACTIVE | COMMUNITY
Start: 2024-12-04 | End: 1900-01-01

## 2024-12-04 RX ORDER — HYDRALAZINE HYDROCHLORIDE 25 MG/1
25 TABLET ORAL
Refills: 0 | Status: ACTIVE | COMMUNITY

## 2024-12-04 RX ORDER — ACETAZOLAMIDE 250 MG/1
TABLET ORAL
Refills: 0 | Status: ACTIVE | COMMUNITY

## 2024-12-05 PROCEDURE — 77336 RADIATION PHYSICS CONSULT: CPT

## 2024-12-05 PROCEDURE — 77014: CPT

## 2024-12-05 PROCEDURE — G6015: CPT

## 2024-12-06 ENCOUNTER — OUTPATIENT (OUTPATIENT)
Dept: OUTPATIENT SERVICES | Facility: HOSPITAL | Age: 69
LOS: 1 days | Discharge: HOME | End: 2024-12-06

## 2024-12-06 DIAGNOSIS — Z98.890 OTHER SPECIFIED POSTPROCEDURAL STATES: Chronic | ICD-10-CM

## 2024-12-06 DIAGNOSIS — C61 MALIGNANT NEOPLASM OF PROSTATE: ICD-10-CM

## 2024-12-06 PROCEDURE — 77014: CPT

## 2024-12-06 PROCEDURE — G6015: CPT

## 2024-12-06 PROCEDURE — 77427 RADIATION TX MANAGEMENT X5: CPT

## 2024-12-09 ENCOUNTER — NON-APPOINTMENT (OUTPATIENT)
Age: 69
End: 2024-12-09

## 2024-12-09 PROCEDURE — G6015: CPT

## 2024-12-09 PROCEDURE — 77014: CPT

## 2024-12-10 VITALS
OXYGEN SATURATION: 98 % | BODY MASS INDEX: 31.92 KG/M2 | SYSTOLIC BLOOD PRESSURE: 152 MMHG | HEART RATE: 69 BPM | HEIGHT: 64 IN | DIASTOLIC BLOOD PRESSURE: 92 MMHG | RESPIRATION RATE: 16 BRPM | WEIGHT: 187 LBS

## 2024-12-10 PROCEDURE — 77014: CPT

## 2024-12-10 PROCEDURE — G6015: CPT

## 2024-12-10 NOTE — ASU PATIENT PROFILE, ADULT - PATIENT'S SEXUAL ORIENTATION
[id number]: || ||00\01||False;[id number]: || ||00\01||False;[id number]: || ||00\01||False;[id number]: || ||00\01||False;
Heterosexual

## 2024-12-11 PROCEDURE — G6015: CPT

## 2024-12-11 PROCEDURE — 77014: CPT

## 2024-12-12 PROCEDURE — 77014: CPT

## 2024-12-12 PROCEDURE — G6015: CPT

## 2024-12-13 PROCEDURE — 77014: CPT

## 2024-12-13 PROCEDURE — 77427 RADIATION TX MANAGEMENT X5: CPT

## 2024-12-13 PROCEDURE — G6015: CPT

## 2024-12-16 DIAGNOSIS — C61 MALIGNANT NEOPLASM OF PROSTATE: ICD-10-CM

## 2024-12-16 PROCEDURE — G6015: CPT

## 2024-12-16 PROCEDURE — 77014: CPT

## 2024-12-16 RX ORDER — TAMSULOSIN HYDROCHLORIDE 0.4 MG/1
0.4 CAPSULE ORAL DAILY
Qty: 90 | Refills: 2 | Status: ACTIVE | COMMUNITY
Start: 2024-12-16 | End: 1900-01-01

## 2024-12-17 ENCOUNTER — RESULT REVIEW (OUTPATIENT)
Age: 69
End: 2024-12-17

## 2024-12-17 ENCOUNTER — APPOINTMENT (OUTPATIENT)
Dept: HEMATOLOGY ONCOLOGY | Facility: CLINIC | Age: 69
End: 2024-12-17
Payer: MEDICARE

## 2024-12-17 ENCOUNTER — NON-APPOINTMENT (OUTPATIENT)
Age: 69
End: 2024-12-17

## 2024-12-17 VITALS
SYSTOLIC BLOOD PRESSURE: 131 MMHG | TEMPERATURE: 98.1 F | HEIGHT: 64 IN | DIASTOLIC BLOOD PRESSURE: 77 MMHG | BODY MASS INDEX: 31.92 KG/M2 | OXYGEN SATURATION: 94 % | WEIGHT: 187 LBS | HEART RATE: 67 BPM

## 2024-12-17 LAB
BASOPHILS # BLD AUTO: 0.03 K/UL — SIGNIFICANT CHANGE UP (ref 0–0.2)
BASOPHILS NFR BLD AUTO: 0.9 % — SIGNIFICANT CHANGE UP (ref 0–2)
EOSINOPHIL # BLD AUTO: 0.1 K/UL — SIGNIFICANT CHANGE UP (ref 0–0.5)
EOSINOPHIL NFR BLD AUTO: 3.2 % — SIGNIFICANT CHANGE UP (ref 0–6)
HCT VFR BLD CALC: 41.7 % — SIGNIFICANT CHANGE UP (ref 39–50)
HGB BLD-MCNC: 13.8 G/DL — SIGNIFICANT CHANGE UP (ref 13–17)
IMM GRANULOCYTES NFR BLD AUTO: 0.3 % — SIGNIFICANT CHANGE UP (ref 0–0.9)
LYMPHOCYTES # BLD AUTO: 0.22 K/UL — LOW (ref 1–3.3)
LYMPHOCYTES # BLD AUTO: 7 % — LOW (ref 13–44)
MCHC RBC-ENTMCNC: 27.4 PG — SIGNIFICANT CHANGE UP (ref 27–34)
MCHC RBC-ENTMCNC: 33.1 G/DL — SIGNIFICANT CHANGE UP (ref 32–36)
MCV RBC AUTO: 82.7 FL — SIGNIFICANT CHANGE UP (ref 80–100)
MONOCYTES # BLD AUTO: 0.38 K/UL — SIGNIFICANT CHANGE UP (ref 0–0.9)
MONOCYTES NFR BLD AUTO: 12 % — SIGNIFICANT CHANGE UP (ref 2–14)
NEUTROPHILS # BLD AUTO: 2.42 K/UL — SIGNIFICANT CHANGE UP (ref 1.8–7.4)
NEUTROPHILS NFR BLD AUTO: 76.6 % — SIGNIFICANT CHANGE UP (ref 43–77)
NRBC # BLD: 0 /100 WBCS — SIGNIFICANT CHANGE UP (ref 0–0)
PLATELET # BLD AUTO: 204 K/UL — SIGNIFICANT CHANGE UP (ref 150–400)
RBC # BLD: 5.04 M/UL — SIGNIFICANT CHANGE UP (ref 4.2–5.8)
RBC # FLD: 15.1 % — HIGH (ref 10.3–14.5)
WBC # BLD: 3.16 K/UL — LOW (ref 3.8–10.5)
WBC # FLD AUTO: 3.16 K/UL — LOW (ref 3.8–10.5)

## 2024-12-17 PROCEDURE — 77336 RADIATION PHYSICS CONSULT: CPT

## 2024-12-17 PROCEDURE — G6015: CPT

## 2024-12-17 PROCEDURE — 99215 OFFICE O/P EST HI 40 MIN: CPT

## 2024-12-17 PROCEDURE — G2211 COMPLEX E/M VISIT ADD ON: CPT

## 2024-12-17 PROCEDURE — 77014: CPT

## 2024-12-18 LAB
ALBUMIN SERPL ELPH-MCNC: 4.2 G/DL
ALP BLD-CCNC: 62 U/L
ALT SERPL-CCNC: 19 U/L
ANION GAP SERPL CALC-SCNC: 10 MMOL/L
AST SERPL-CCNC: 16 U/L
BILIRUB SERPL-MCNC: 0.2 MG/DL
BUN SERPL-MCNC: 12 MG/DL
CALCIUM SERPL-MCNC: 9.3 MG/DL
CHLORIDE SERPL-SCNC: 109 MMOL/L
CO2 SERPL-SCNC: 24 MMOL/L
CREAT SERPL-MCNC: 1.02 MG/DL
EGFR: 81 ML/MIN/1.73M2
GLUCOSE SERPL-MCNC: 100 MG/DL
POTASSIUM SERPL-SCNC: 4.1 MMOL/L
PROT SERPL-MCNC: 6.5 G/DL
PSA FREE FLD-MCNC: NORMAL %
PSA FREE SERPL-MCNC: <0.01 NG/ML
PSA SERPL-MCNC: <0.01 NG/ML
SODIUM SERPL-SCNC: 142 MMOL/L
TSH SERPL-ACNC: 1.27 UIU/ML

## 2024-12-20 LAB
TESTOST FREE SERPL-MCNC: 1.4 PG/ML
TESTOST SERPL-MCNC: 18.9 NG/DL

## 2024-12-27 ENCOUNTER — RESULT REVIEW (OUTPATIENT)
Age: 69
End: 2024-12-27

## 2024-12-27 ENCOUNTER — APPOINTMENT (OUTPATIENT)
Dept: HEMATOLOGY ONCOLOGY | Facility: CLINIC | Age: 69
End: 2024-12-27
Payer: MEDICARE

## 2024-12-27 VITALS
BODY MASS INDEX: 31.76 KG/M2 | HEART RATE: 69 BPM | HEIGHT: 64 IN | WEIGHT: 186 LBS | TEMPERATURE: 97.6 F | DIASTOLIC BLOOD PRESSURE: 71 MMHG | OXYGEN SATURATION: 96 % | SYSTOLIC BLOOD PRESSURE: 115 MMHG

## 2024-12-27 DIAGNOSIS — C61 MALIGNANT NEOPLASM OF PROSTATE: ICD-10-CM

## 2024-12-27 LAB
BASOPHILS # BLD AUTO: 0.03 K/UL — SIGNIFICANT CHANGE UP (ref 0–0.2)
BASOPHILS NFR BLD AUTO: 0.8 % — SIGNIFICANT CHANGE UP (ref 0–2)
EOSINOPHIL # BLD AUTO: 0.13 K/UL — SIGNIFICANT CHANGE UP (ref 0–0.5)
EOSINOPHIL NFR BLD AUTO: 3.6 % — SIGNIFICANT CHANGE UP (ref 0–6)
HCT VFR BLD CALC: 42.8 % — SIGNIFICANT CHANGE UP (ref 39–50)
HGB BLD-MCNC: 13.7 G/DL — SIGNIFICANT CHANGE UP (ref 13–17)
IMM GRANULOCYTES NFR BLD AUTO: 0.6 % — SIGNIFICANT CHANGE UP (ref 0–0.9)
LYMPHOCYTES # BLD AUTO: 0.2 K/UL — LOW (ref 1–3.3)
LYMPHOCYTES # BLD AUTO: 5.6 % — LOW (ref 13–44)
MCHC RBC-ENTMCNC: 26.9 PG — LOW (ref 27–34)
MCHC RBC-ENTMCNC: 32 G/DL — SIGNIFICANT CHANGE UP (ref 32–36)
MCV RBC AUTO: 84.1 FL — SIGNIFICANT CHANGE UP (ref 80–100)
MONOCYTES # BLD AUTO: 0.37 K/UL — SIGNIFICANT CHANGE UP (ref 0–0.9)
MONOCYTES NFR BLD AUTO: 10.4 % — SIGNIFICANT CHANGE UP (ref 2–14)
NEUTROPHILS # BLD AUTO: 2.82 K/UL — SIGNIFICANT CHANGE UP (ref 1.8–7.4)
NEUTROPHILS NFR BLD AUTO: 79 % — HIGH (ref 43–77)
NRBC # BLD: 0 /100 WBCS — SIGNIFICANT CHANGE UP (ref 0–0)
PLATELET # BLD AUTO: 135 K/UL — LOW (ref 150–400)
RBC # BLD: 5.09 M/UL — SIGNIFICANT CHANGE UP (ref 4.2–5.8)
RBC # FLD: 15 % — HIGH (ref 10.3–14.5)
WBC # BLD: 3.57 K/UL — LOW (ref 3.8–10.5)
WBC # FLD AUTO: 3.57 K/UL — LOW (ref 3.8–10.5)

## 2024-12-27 PROCEDURE — G2211 COMPLEX E/M VISIT ADD ON: CPT

## 2024-12-27 PROCEDURE — 99214 OFFICE O/P EST MOD 30 MIN: CPT

## 2024-12-28 LAB
ALBUMIN SERPL ELPH-MCNC: 4.2 G/DL
ALP BLD-CCNC: 65 U/L
ALT SERPL-CCNC: 19 U/L
ANION GAP SERPL CALC-SCNC: 10 MMOL/L
AST SERPL-CCNC: 15 U/L
BILIRUB SERPL-MCNC: 0.2 MG/DL
BUN SERPL-MCNC: 14 MG/DL
CALCIUM SERPL-MCNC: 9.2 MG/DL
CHLORIDE SERPL-SCNC: 110 MMOL/L
CO2 SERPL-SCNC: 24 MMOL/L
CREAT SERPL-MCNC: 1 MG/DL
EGFR: 82 ML/MIN/1.73M2
GLUCOSE SERPL-MCNC: 110 MG/DL
POTASSIUM SERPL-SCNC: 3.9 MMOL/L
PROT SERPL-MCNC: 6.7 G/DL
PSA SERPL-MCNC: <0.01 NG/ML
SODIUM SERPL-SCNC: 144 MMOL/L
T4 FREE SERPL-MCNC: 1.1 NG/DL
TSH SERPL-ACNC: 1.36 UIU/ML

## 2024-12-29 LAB
TESTOST FREE SERPL-MCNC: 0.8 PG/ML
TESTOST SERPL-MCNC: 21.1 NG/DL

## 2025-01-03 VITALS — DIASTOLIC BLOOD PRESSURE: 76 MMHG | SYSTOLIC BLOOD PRESSURE: 129 MMHG

## 2025-01-17 ENCOUNTER — APPOINTMENT (OUTPATIENT)
Dept: HEMATOLOGY ONCOLOGY | Facility: CLINIC | Age: 70
End: 2025-01-17
Payer: MEDICARE

## 2025-01-17 ENCOUNTER — APPOINTMENT (OUTPATIENT)
Dept: RADIATION ONCOLOGY | Facility: CLINIC | Age: 70
End: 2025-01-17
Payer: MEDICARE

## 2025-01-17 VITALS
HEART RATE: 66 BPM | DIASTOLIC BLOOD PRESSURE: 70 MMHG | WEIGHT: 186 LBS | TEMPERATURE: 97.2 F | HEIGHT: 64 IN | BODY MASS INDEX: 31.76 KG/M2 | SYSTOLIC BLOOD PRESSURE: 116 MMHG | OXYGEN SATURATION: 95 %

## 2025-01-17 DIAGNOSIS — C61 MALIGNANT NEOPLASM OF PROSTATE: ICD-10-CM

## 2025-01-17 PROCEDURE — 99024 POSTOP FOLLOW-UP VISIT: CPT

## 2025-01-17 PROCEDURE — 99214 OFFICE O/P EST MOD 30 MIN: CPT

## 2025-01-17 PROCEDURE — G2211 COMPLEX E/M VISIT ADD ON: CPT

## 2025-01-31 ENCOUNTER — OUTPATIENT (OUTPATIENT)
Dept: OUTPATIENT SERVICES | Facility: HOSPITAL | Age: 70
LOS: 1 days | Discharge: HOME | End: 2025-01-31

## 2025-01-31 DIAGNOSIS — Z98.890 OTHER SPECIFIED POSTPROCEDURAL STATES: Chronic | ICD-10-CM

## 2025-01-31 DIAGNOSIS — C61 MALIGNANT NEOPLASM OF PROSTATE: ICD-10-CM

## 2025-02-06 DIAGNOSIS — C61 MALIGNANT NEOPLASM OF PROSTATE: ICD-10-CM

## 2025-02-07 ENCOUNTER — APPOINTMENT (OUTPATIENT)
Dept: HEMATOLOGY ONCOLOGY | Facility: CLINIC | Age: 70
End: 2025-02-07
Payer: MEDICARE

## 2025-02-07 ENCOUNTER — APPOINTMENT (OUTPATIENT)
Dept: INFUSION THERAPY | Facility: CLINIC | Age: 70
End: 2025-02-07
Payer: MEDICARE

## 2025-02-07 ENCOUNTER — RESULT REVIEW (OUTPATIENT)
Age: 70
End: 2025-02-07

## 2025-02-07 ENCOUNTER — NON-APPOINTMENT (OUTPATIENT)
Age: 70
End: 2025-02-07

## 2025-02-07 VITALS
TEMPERATURE: 97.6 F | SYSTOLIC BLOOD PRESSURE: 129 MMHG | WEIGHT: 186 LBS | HEIGHT: 64 IN | HEART RATE: 71 BPM | OXYGEN SATURATION: 95 % | DIASTOLIC BLOOD PRESSURE: 70 MMHG | BODY MASS INDEX: 31.76 KG/M2

## 2025-02-07 DIAGNOSIS — C61 MALIGNANT NEOPLASM OF PROSTATE: ICD-10-CM

## 2025-02-07 LAB
BASOPHILS # BLD AUTO: 0.03 K/UL — SIGNIFICANT CHANGE UP (ref 0–0.2)
BASOPHILS NFR BLD AUTO: 0.7 % — SIGNIFICANT CHANGE UP (ref 0–2)
EOSINOPHIL # BLD AUTO: 0.13 K/UL — SIGNIFICANT CHANGE UP (ref 0–0.5)
EOSINOPHIL NFR BLD AUTO: 2.8 % — SIGNIFICANT CHANGE UP (ref 0–6)
HCT VFR BLD CALC: 41.7 % — SIGNIFICANT CHANGE UP (ref 39–50)
HGB BLD-MCNC: 13.6 G/DL — SIGNIFICANT CHANGE UP (ref 13–17)
IMM GRANULOCYTES NFR BLD AUTO: 0.7 % — SIGNIFICANT CHANGE UP (ref 0–0.9)
LYMPHOCYTES # BLD AUTO: 0.32 K/UL — LOW (ref 1–3.3)
LYMPHOCYTES # BLD AUTO: 7 % — LOW (ref 13–44)
MCHC RBC-ENTMCNC: 27.6 PG — SIGNIFICANT CHANGE UP (ref 27–34)
MCHC RBC-ENTMCNC: 32.6 G/DL — SIGNIFICANT CHANGE UP (ref 32–36)
MCV RBC AUTO: 84.8 FL — SIGNIFICANT CHANGE UP (ref 80–100)
MONOCYTES # BLD AUTO: 0.36 K/UL — SIGNIFICANT CHANGE UP (ref 0–0.9)
MONOCYTES NFR BLD AUTO: 7.9 % — SIGNIFICANT CHANGE UP (ref 2–14)
NEUTROPHILS # BLD AUTO: 3.71 K/UL — SIGNIFICANT CHANGE UP (ref 1.8–7.4)
NEUTROPHILS NFR BLD AUTO: 80.9 % — HIGH (ref 43–77)
NRBC # BLD: 0 /100 WBCS — SIGNIFICANT CHANGE UP (ref 0–0)
NRBC BLD-RTO: 0 /100 WBCS — SIGNIFICANT CHANGE UP (ref 0–0)
PLATELET # BLD AUTO: 186 K/UL — SIGNIFICANT CHANGE UP (ref 150–400)
RBC # BLD: 4.92 M/UL — SIGNIFICANT CHANGE UP (ref 4.2–5.8)
RBC # FLD: 13.4 % — SIGNIFICANT CHANGE UP (ref 10.3–14.5)
WBC # BLD: 4.58 K/UL — SIGNIFICANT CHANGE UP (ref 3.8–10.5)
WBC # FLD AUTO: 4.58 K/UL — SIGNIFICANT CHANGE UP (ref 3.8–10.5)

## 2025-02-07 PROCEDURE — G2211 COMPLEX E/M VISIT ADD ON: CPT

## 2025-02-07 PROCEDURE — 99214 OFFICE O/P EST MOD 30 MIN: CPT

## 2025-02-10 LAB
ALBUMIN SERPL ELPH-MCNC: 4.5 G/DL
ALP BLD-CCNC: 81 U/L
ALT SERPL-CCNC: 22 U/L
ANION GAP SERPL CALC-SCNC: 12 MMOL/L
AST SERPL-CCNC: 18 U/L
BILIRUB SERPL-MCNC: 0.2 MG/DL
BUN SERPL-MCNC: 20 MG/DL
CALCIUM SERPL-MCNC: 9.6 MG/DL
CHLORIDE SERPL-SCNC: 103 MMOL/L
CO2 SERPL-SCNC: 26 MMOL/L
CREAT SERPL-MCNC: 0.84 MG/DL
EGFR: 96 ML/MIN/1.73M2
GLUCOSE SERPL-MCNC: 107 MG/DL
POTASSIUM SERPL-SCNC: 4.1 MMOL/L
PROT SERPL-MCNC: 6.8 G/DL
PSA SERPL-MCNC: <0.01 NG/ML
SODIUM SERPL-SCNC: 141 MMOL/L
TESTOST FREE SERPL-MCNC: 0 PG/ML
TESTOST SERPL-MCNC: <2.5 NG/DL

## 2025-03-25 ENCOUNTER — RESULT REVIEW (OUTPATIENT)
Age: 70
End: 2025-03-25

## 2025-03-25 ENCOUNTER — APPOINTMENT (OUTPATIENT)
Dept: HEMATOLOGY ONCOLOGY | Facility: CLINIC | Age: 70
End: 2025-03-25
Payer: MEDICARE

## 2025-03-25 ENCOUNTER — LABORATORY RESULT (OUTPATIENT)
Age: 70
End: 2025-03-25

## 2025-03-25 VITALS
TEMPERATURE: 97.2 F | SYSTOLIC BLOOD PRESSURE: 100 MMHG | WEIGHT: 189 LBS | HEART RATE: 65 BPM | HEIGHT: 64 IN | DIASTOLIC BLOOD PRESSURE: 64 MMHG | OXYGEN SATURATION: 96 % | BODY MASS INDEX: 32.27 KG/M2

## 2025-03-25 DIAGNOSIS — C61 MALIGNANT NEOPLASM OF PROSTATE: ICD-10-CM

## 2025-03-25 LAB
BASOPHILS # BLD AUTO: 0.04 K/UL — SIGNIFICANT CHANGE UP (ref 0–0.2)
BASOPHILS NFR BLD AUTO: 0.9 % — SIGNIFICANT CHANGE UP (ref 0–2)
EOSINOPHIL # BLD AUTO: 0.19 K/UL — SIGNIFICANT CHANGE UP (ref 0–0.5)
EOSINOPHIL NFR BLD AUTO: 4.3 % — SIGNIFICANT CHANGE UP (ref 0–6)
HCT VFR BLD CALC: 43.1 % — SIGNIFICANT CHANGE UP (ref 39–50)
HGB BLD-MCNC: 13.7 G/DL — SIGNIFICANT CHANGE UP (ref 13–17)
IMM GRANULOCYTES NFR BLD AUTO: 0.2 % — SIGNIFICANT CHANGE UP (ref 0–0.9)
LYMPHOCYTES # BLD AUTO: 0.46 K/UL — LOW (ref 1–3.3)
LYMPHOCYTES # BLD AUTO: 10.3 % — LOW (ref 13–44)
MCHC RBC-ENTMCNC: 27.2 PG — SIGNIFICANT CHANGE UP (ref 27–34)
MCHC RBC-ENTMCNC: 31.8 G/DL — LOW (ref 32–36)
MCV RBC AUTO: 85.7 FL — SIGNIFICANT CHANGE UP (ref 80–100)
MONOCYTES # BLD AUTO: 0.46 K/UL — SIGNIFICANT CHANGE UP (ref 0–0.9)
MONOCYTES NFR BLD AUTO: 10.3 % — SIGNIFICANT CHANGE UP (ref 2–14)
NEUTROPHILS # BLD AUTO: 3.29 K/UL — SIGNIFICANT CHANGE UP (ref 1.8–7.4)
NEUTROPHILS NFR BLD AUTO: 74 % — SIGNIFICANT CHANGE UP (ref 43–77)
NRBC BLD AUTO-RTO: 0 /100 WBCS — SIGNIFICANT CHANGE UP (ref 0–0)
PLATELET # BLD AUTO: 185 K/UL — SIGNIFICANT CHANGE UP (ref 150–400)
RBC # BLD: 5.03 M/UL — SIGNIFICANT CHANGE UP (ref 4.2–5.8)
RBC # FLD: 12.9 % — SIGNIFICANT CHANGE UP (ref 10.3–14.5)
WBC # BLD: 4.45 K/UL — SIGNIFICANT CHANGE UP (ref 3.8–10.5)
WBC # FLD AUTO: 4.45 K/UL — SIGNIFICANT CHANGE UP (ref 3.8–10.5)

## 2025-03-25 PROCEDURE — 99213 OFFICE O/P EST LOW 20 MIN: CPT

## 2025-03-25 PROCEDURE — G2211 COMPLEX E/M VISIT ADD ON: CPT

## 2025-03-26 LAB
ALBUMIN SERPL ELPH-MCNC: 4.5 G/DL
ALP BLD-CCNC: 82 U/L
ALT SERPL-CCNC: 27 U/L
ANION GAP SERPL CALC-SCNC: 10 MMOL/L
AST SERPL-CCNC: 20 U/L
BILIRUB SERPL-MCNC: 0.4 MG/DL
BUN SERPL-MCNC: 17 MG/DL
CALCIUM SERPL-MCNC: 10.1 MG/DL
CHLORIDE SERPL-SCNC: 104 MMOL/L
CO2 SERPL-SCNC: 29 MMOL/L
CREAT SERPL-MCNC: 0.94 MG/DL
EGFRCR SERPLBLD CKD-EPI 2021: 89 ML/MIN/1.73M2
GLUCOSE SERPL-MCNC: 100 MG/DL
POTASSIUM SERPL-SCNC: 3.9 MMOL/L
PROT SERPL-MCNC: 6.8 G/DL
PSA FREE FLD-MCNC: NORMAL %
PSA FREE SERPL-MCNC: <0.01 NG/ML
PSA SERPL-MCNC: <0.01 NG/ML
SODIUM SERPL-SCNC: 143 MMOL/L
TESTOST FREE SERPL-MCNC: 0.8 PG/ML
TESTOST SERPL-MCNC: 5.3 NG/DL

## 2025-03-28 ENCOUNTER — APPOINTMENT (OUTPATIENT)
Dept: RADIATION ONCOLOGY | Facility: CLINIC | Age: 70
End: 2025-03-28
Payer: MEDICARE

## 2025-03-28 VITALS
HEIGHT: 64 IN | OXYGEN SATURATION: 96 % | RESPIRATION RATE: 16 BRPM | SYSTOLIC BLOOD PRESSURE: 98 MMHG | WEIGHT: 186 LBS | HEART RATE: 57 BPM | DIASTOLIC BLOOD PRESSURE: 61 MMHG | BODY MASS INDEX: 31.76 KG/M2

## 2025-03-28 DIAGNOSIS — C61 MALIGNANT NEOPLASM OF PROSTATE: ICD-10-CM

## 2025-03-28 PROCEDURE — 99213 OFFICE O/P EST LOW 20 MIN: CPT

## 2025-04-25 ENCOUNTER — OUTPATIENT (OUTPATIENT)
Dept: OUTPATIENT SERVICES | Facility: HOSPITAL | Age: 70
LOS: 1 days | Discharge: HOME | End: 2025-04-25

## 2025-04-25 DIAGNOSIS — Z98.890 OTHER SPECIFIED POSTPROCEDURAL STATES: Chronic | ICD-10-CM

## 2025-04-25 DIAGNOSIS — D64.9 ANEMIA, UNSPECIFIED: ICD-10-CM

## 2025-05-02 ENCOUNTER — APPOINTMENT (OUTPATIENT)
Dept: HEMATOLOGY ONCOLOGY | Facility: CLINIC | Age: 70
End: 2025-05-02
Payer: MEDICARE

## 2025-05-02 ENCOUNTER — RESULT REVIEW (OUTPATIENT)
Age: 70
End: 2025-05-02

## 2025-05-02 ENCOUNTER — APPOINTMENT (OUTPATIENT)
Dept: INFUSION THERAPY | Facility: CLINIC | Age: 70
End: 2025-05-02
Payer: MEDICARE

## 2025-05-02 VITALS
OXYGEN SATURATION: 96 % | HEART RATE: 63 BPM | BODY MASS INDEX: 32.78 KG/M2 | TEMPERATURE: 98 F | HEIGHT: 64 IN | DIASTOLIC BLOOD PRESSURE: 78 MMHG | WEIGHT: 192 LBS | SYSTOLIC BLOOD PRESSURE: 127 MMHG

## 2025-05-02 DIAGNOSIS — C61 MALIGNANT NEOPLASM OF PROSTATE: ICD-10-CM

## 2025-05-02 LAB
BASOPHILS # BLD AUTO: 0.04 K/UL — SIGNIFICANT CHANGE UP (ref 0–0.2)
BASOPHILS NFR BLD AUTO: 0.9 % — SIGNIFICANT CHANGE UP (ref 0–2)
EOSINOPHIL # BLD AUTO: 0.18 K/UL — SIGNIFICANT CHANGE UP (ref 0–0.5)
EOSINOPHIL NFR BLD AUTO: 4 % — SIGNIFICANT CHANGE UP (ref 0–6)
HCT VFR BLD CALC: 43.3 % — SIGNIFICANT CHANGE UP (ref 39–50)
HGB BLD-MCNC: 13.9 G/DL — SIGNIFICANT CHANGE UP (ref 13–17)
IMM GRANULOCYTES NFR BLD AUTO: 0.4 % — SIGNIFICANT CHANGE UP (ref 0–0.9)
LYMPHOCYTES # BLD AUTO: 0.42 K/UL — LOW (ref 1–3.3)
LYMPHOCYTES # BLD AUTO: 9.4 % — LOW (ref 13–44)
MCHC RBC-ENTMCNC: 27 PG — SIGNIFICANT CHANGE UP (ref 27–34)
MCHC RBC-ENTMCNC: 32.1 G/DL — SIGNIFICANT CHANGE UP (ref 32–36)
MCV RBC AUTO: 84.2 FL — SIGNIFICANT CHANGE UP (ref 80–100)
MONOCYTES # BLD AUTO: 0.34 K/UL — SIGNIFICANT CHANGE UP (ref 0–0.9)
MONOCYTES NFR BLD AUTO: 7.6 % — SIGNIFICANT CHANGE UP (ref 2–14)
NEUTROPHILS # BLD AUTO: 3.45 K/UL — SIGNIFICANT CHANGE UP (ref 1.8–7.4)
NEUTROPHILS NFR BLD AUTO: 77.7 % — HIGH (ref 43–77)
NRBC BLD AUTO-RTO: 0 /100 WBCS — SIGNIFICANT CHANGE UP (ref 0–0)
PLATELET # BLD AUTO: 168 K/UL — SIGNIFICANT CHANGE UP (ref 150–400)
RBC # BLD: 5.14 M/UL — SIGNIFICANT CHANGE UP (ref 4.2–5.8)
RBC # FLD: 13.2 % — SIGNIFICANT CHANGE UP (ref 10.3–14.5)
WBC # BLD: 4.45 K/UL — SIGNIFICANT CHANGE UP (ref 3.8–10.5)
WBC # FLD AUTO: 4.45 K/UL — SIGNIFICANT CHANGE UP (ref 3.8–10.5)

## 2025-05-02 PROCEDURE — 99213 OFFICE O/P EST LOW 20 MIN: CPT

## 2025-05-02 PROCEDURE — G2211 COMPLEX E/M VISIT ADD ON: CPT

## 2025-05-04 DIAGNOSIS — C61 MALIGNANT NEOPLASM OF PROSTATE: ICD-10-CM

## 2025-05-05 LAB
ALBUMIN SERPL ELPH-MCNC: 4.6 G/DL
ALP BLD-CCNC: 78 U/L
ALT SERPL-CCNC: 38 U/L
ANION GAP SERPL CALC-SCNC: 11 MMOL/L
AST SERPL-CCNC: 31 U/L
BILIRUB SERPL-MCNC: 0.4 MG/DL
BUN SERPL-MCNC: 17 MG/DL
CALCIUM SERPL-MCNC: 10 MG/DL
CHLORIDE SERPL-SCNC: 105 MMOL/L
CO2 SERPL-SCNC: 29 MMOL/L
CREAT SERPL-MCNC: 1.02 MG/DL
EGFRCR SERPLBLD CKD-EPI 2021: 81 ML/MIN/1.73M2
GLUCOSE SERPL-MCNC: 105 MG/DL
POTASSIUM SERPL-SCNC: 5.1 MMOL/L
PROT SERPL-MCNC: 7.1 G/DL
PSA FREE FLD-MCNC: NORMAL %
PSA FREE SERPL-MCNC: <0.01 NG/ML
PSA SERPL-MCNC: <0.01 NG/ML
SODIUM SERPL-SCNC: 145 MMOL/L
TESTOST FREE SERPL-MCNC: 0.1 PG/ML
TESTOST SERPL-MCNC: 6.9 NG/DL

## 2025-06-13 ENCOUNTER — OUTPATIENT (OUTPATIENT)
Dept: OUTPATIENT SERVICES | Facility: HOSPITAL | Age: 70
LOS: 1 days | Discharge: HOME | End: 2025-06-13

## 2025-06-13 DIAGNOSIS — Z98.890 OTHER SPECIFIED POSTPROCEDURAL STATES: Chronic | ICD-10-CM

## 2025-06-13 DIAGNOSIS — C61 MALIGNANT NEOPLASM OF PROSTATE: ICD-10-CM

## 2025-06-24 ENCOUNTER — APPOINTMENT (OUTPATIENT)
Dept: HEMATOLOGY ONCOLOGY | Facility: CLINIC | Age: 70
End: 2025-06-24
Payer: MEDICARE

## 2025-06-24 ENCOUNTER — RESULT REVIEW (OUTPATIENT)
Age: 70
End: 2025-06-24

## 2025-06-24 VITALS
OXYGEN SATURATION: 95 % | DIASTOLIC BLOOD PRESSURE: 64 MMHG | WEIGHT: 194 LBS | BODY MASS INDEX: 33.3 KG/M2 | TEMPERATURE: 97.2 F | SYSTOLIC BLOOD PRESSURE: 103 MMHG | HEART RATE: 53 BPM

## 2025-06-24 LAB
BASOPHILS # BLD AUTO: 0.04 K/UL — SIGNIFICANT CHANGE UP (ref 0–0.2)
BASOPHILS NFR BLD AUTO: 0.8 % — SIGNIFICANT CHANGE UP (ref 0–2)
EOSINOPHIL # BLD AUTO: 0.27 K/UL — SIGNIFICANT CHANGE UP (ref 0–0.5)
EOSINOPHIL NFR BLD AUTO: 5.1 % — SIGNIFICANT CHANGE UP (ref 0–6)
HCT VFR BLD CALC: 40.8 % — SIGNIFICANT CHANGE UP (ref 39–50)
HGB BLD-MCNC: 12.7 G/DL — LOW (ref 13–17)
IMM GRANULOCYTES # BLD AUTO: 0.01 K/UL — SIGNIFICANT CHANGE UP (ref 0–0.07)
IMM GRANULOCYTES NFR BLD AUTO: 0.2 % — SIGNIFICANT CHANGE UP (ref 0–0.9)
LYMPHOCYTES # BLD AUTO: 0.49 K/UL — LOW (ref 1–3.3)
LYMPHOCYTES NFR BLD AUTO: 9.3 % — LOW (ref 13–44)
MCHC RBC-ENTMCNC: 25.6 PG — LOW (ref 27–34)
MCHC RBC-ENTMCNC: 31.1 G/DL — LOW (ref 32–36)
MCV RBC AUTO: 82.3 FL — SIGNIFICANT CHANGE UP (ref 80–100)
MONOCYTES # BLD AUTO: 0.35 K/UL — SIGNIFICANT CHANGE UP (ref 0–0.9)
MONOCYTES NFR BLD AUTO: 6.6 % — SIGNIFICANT CHANGE UP (ref 2–14)
NEUTROPHILS # BLD AUTO: 4.13 K/UL — SIGNIFICANT CHANGE UP (ref 1.8–7.4)
NEUTROPHILS NFR BLD AUTO: 78 % — HIGH (ref 43–77)
NRBC # BLD AUTO: 0 K/UL — SIGNIFICANT CHANGE UP (ref 0–0)
NRBC # FLD: 0 K/UL — SIGNIFICANT CHANGE UP (ref 0–0)
NRBC BLD AUTO-RTO: 0 /100 WBCS — SIGNIFICANT CHANGE UP (ref 0–0)
PLATELET # BLD AUTO: 208 K/UL — SIGNIFICANT CHANGE UP (ref 150–400)
PMV BLD: 9.6 FL — SIGNIFICANT CHANGE UP (ref 7–13)
RBC # BLD: 4.96 M/UL — SIGNIFICANT CHANGE UP (ref 4.2–5.8)
RBC # FLD: 14 % — SIGNIFICANT CHANGE UP (ref 10.3–14.5)
WBC # BLD: 5.29 K/UL — SIGNIFICANT CHANGE UP (ref 3.8–10.5)
WBC # FLD AUTO: 5.29 K/UL — SIGNIFICANT CHANGE UP (ref 3.8–10.5)

## 2025-06-24 PROCEDURE — G2211 COMPLEX E/M VISIT ADD ON: CPT

## 2025-06-24 PROCEDURE — 99214 OFFICE O/P EST MOD 30 MIN: CPT

## 2025-06-26 LAB
ALBUMIN SERPL ELPH-MCNC: 4.3 G/DL
ALP BLD-CCNC: 97 U/L
ALT SERPL-CCNC: 23 U/L
ANION GAP SERPL CALC-SCNC: 14 MMOL/L
AST SERPL-CCNC: 20 U/L
BILIRUB SERPL-MCNC: 0.4 MG/DL
BUN SERPL-MCNC: 16 MG/DL
CALCIUM SERPL-MCNC: 9.3 MG/DL
CHLORIDE SERPL-SCNC: 104 MMOL/L
CO2 SERPL-SCNC: 26 MMOL/L
CREAT SERPL-MCNC: 0.9 MG/DL
EGFRCR SERPLBLD CKD-EPI 2021: 94 ML/MIN/1.73M2
GLUCOSE SERPL-MCNC: 110 MG/DL
POTASSIUM SERPL-SCNC: 4.6 MMOL/L
PROT SERPL-MCNC: 6.8 G/DL
PSA FREE FLD-MCNC: NORMAL %
PSA FREE SERPL-MCNC: <0.01 NG/ML
PSA SERPL-MCNC: <0.01 NG/ML
SODIUM SERPL-SCNC: 143 MMOL/L
TSH SERPL-ACNC: 0.49 UIU/ML

## 2025-08-01 ENCOUNTER — RESULT REVIEW (OUTPATIENT)
Age: 70
End: 2025-08-01

## 2025-08-01 ENCOUNTER — APPOINTMENT (OUTPATIENT)
Dept: INFUSION THERAPY | Facility: CLINIC | Age: 70
End: 2025-08-01
Payer: MEDICARE

## 2025-08-01 ENCOUNTER — APPOINTMENT (OUTPATIENT)
Dept: HEMATOLOGY ONCOLOGY | Facility: CLINIC | Age: 70
End: 2025-08-01
Payer: MEDICARE

## 2025-08-01 VITALS
BODY MASS INDEX: 34.33 KG/M2 | OXYGEN SATURATION: 97 % | WEIGHT: 200 LBS | HEART RATE: 60 BPM | DIASTOLIC BLOOD PRESSURE: 78 MMHG | TEMPERATURE: 97.1 F | SYSTOLIC BLOOD PRESSURE: 131 MMHG

## 2025-08-01 DIAGNOSIS — C61 MALIGNANT NEOPLASM OF PROSTATE: ICD-10-CM

## 2025-08-01 LAB
ALBUMIN SERPL ELPH-MCNC: 4.6 G/DL
ALP BLD-CCNC: 84 U/L
ALT SERPL-CCNC: 43 U/L
ANION GAP SERPL CALC-SCNC: 12 MMOL/L
ANISOCYTOSIS BLD QL: SLIGHT — SIGNIFICANT CHANGE UP
AST SERPL-CCNC: 29 U/L
BASOPHILS # BLD AUTO: 0.03 K/UL — SIGNIFICANT CHANGE UP (ref 0–0.2)
BASOPHILS # BLD MANUAL: 0 K/UL — SIGNIFICANT CHANGE UP (ref 0–0.2)
BASOPHILS NFR BLD AUTO: 0.7 % — SIGNIFICANT CHANGE UP (ref 0–2)
BASOPHILS NFR BLD MANUAL: 0 % — SIGNIFICANT CHANGE UP (ref 0–2)
BILIRUB SERPL-MCNC: 0.4 MG/DL
BUN SERPL-MCNC: 12 MG/DL
CALCIUM SERPL-MCNC: 9.9 MG/DL
CHLORIDE SERPL-SCNC: 104 MMOL/L
CO2 SERPL-SCNC: 27 MMOL/L
CREAT SERPL-MCNC: 0.87 MG/DL
DACRYOCYTES BLD QL SMEAR: SLIGHT — SIGNIFICANT CHANGE UP
EGFRCR SERPLBLD CKD-EPI 2021: 94 ML/MIN/1.73M2
ELLIPTOCYTES BLD QL SMEAR: SLIGHT — SIGNIFICANT CHANGE UP
EOSINOPHIL # BLD AUTO: 0.18 K/UL — SIGNIFICANT CHANGE UP (ref 0–0.5)
EOSINOPHIL # BLD MANUAL: 0.26 K/UL — SIGNIFICANT CHANGE UP (ref 0–0.5)
EOSINOPHIL NFR BLD AUTO: 4.1 % — SIGNIFICANT CHANGE UP (ref 0–6)
EOSINOPHIL NFR BLD MANUAL: 6 % — SIGNIFICANT CHANGE UP (ref 0–6)
GLUCOSE SERPL-MCNC: 91 MG/DL
HCT VFR BLD CALC: 42.9 % — SIGNIFICANT CHANGE UP (ref 39–50)
HGB BLD-MCNC: 13.5 G/DL — SIGNIFICANT CHANGE UP (ref 13–17)
IMM GRANULOCYTES # BLD AUTO: 0.02 K/UL — SIGNIFICANT CHANGE UP (ref 0–0.07)
IMM GRANULOCYTES NFR BLD AUTO: 0.5 % — SIGNIFICANT CHANGE UP (ref 0–0.9)
LYMPHOCYTES # BLD AUTO: 0.45 K/UL — LOW (ref 1–3.3)
LYMPHOCYTES # BLD MANUAL: 0.53 K/UL — LOW (ref 1–3.3)
LYMPHOCYTES NFR BLD AUTO: 10.3 % — LOW (ref 13–44)
LYMPHOCYTES NFR BLD MANUAL: 12 % — LOW (ref 13–44)
MCHC RBC-ENTMCNC: 26.2 PG — LOW (ref 27–34)
MCHC RBC-ENTMCNC: 31.5 G/DL — LOW (ref 32–36)
MCV RBC AUTO: 83.1 FL — SIGNIFICANT CHANGE UP (ref 80–100)
MONOCYTES # BLD AUTO: 0.34 K/UL — SIGNIFICANT CHANGE UP (ref 0–0.9)
MONOCYTES # BLD MANUAL: 0.48 K/UL — SIGNIFICANT CHANGE UP (ref 0–0.9)
MONOCYTES NFR BLD AUTO: 7.8 % — SIGNIFICANT CHANGE UP (ref 2–14)
MONOCYTES NFR BLD MANUAL: 11 % — SIGNIFICANT CHANGE UP (ref 2–14)
NEUTROPHILS # BLD AUTO: 3.36 K/UL — SIGNIFICANT CHANGE UP (ref 1.8–7.4)
NEUTROPHILS # BLD MANUAL: 3.11 K/UL — SIGNIFICANT CHANGE UP (ref 1.8–7.4)
NEUTROPHILS NFR BLD AUTO: 76.6 % — SIGNIFICANT CHANGE UP (ref 43–77)
NEUTROPHILS NFR BLD MANUAL: 71 % — SIGNIFICANT CHANGE UP (ref 43–77)
NRBC # BLD AUTO: 0 K/UL — SIGNIFICANT CHANGE UP (ref 0–0)
NRBC # FLD: 0 K/UL — SIGNIFICANT CHANGE UP (ref 0–0)
NRBC BLD AUTO-RTO: 0 /100 WBCS — SIGNIFICANT CHANGE UP (ref 0–0)
PLAT MORPH BLD: NORMAL — SIGNIFICANT CHANGE UP
PLATELET # BLD AUTO: 181 K/UL — SIGNIFICANT CHANGE UP (ref 150–400)
PMV BLD: 9.4 FL — SIGNIFICANT CHANGE UP (ref 7–13)
POTASSIUM SERPL-SCNC: 4.2 MMOL/L
PROT SERPL-MCNC: 6.9 G/DL
RBC # BLD: 5.16 M/UL — SIGNIFICANT CHANGE UP (ref 4.2–5.8)
RBC # FLD: 15 % — HIGH (ref 10.3–14.5)
RBC BLD AUTO: SIGNIFICANT CHANGE UP
SODIUM SERPL-SCNC: 143 MMOL/L
WBC # BLD: 4.38 K/UL — SIGNIFICANT CHANGE UP (ref 3.8–10.5)
WBC # FLD AUTO: 4.38 K/UL — SIGNIFICANT CHANGE UP (ref 3.8–10.5)

## 2025-08-01 PROCEDURE — G2211 COMPLEX E/M VISIT ADD ON: CPT

## 2025-08-01 PROCEDURE — 99213 OFFICE O/P EST LOW 20 MIN: CPT

## 2025-08-04 LAB
PSA FREE FLD-MCNC: NORMAL %
PSA FREE SERPL-MCNC: <0.01 NG/ML
PSA SERPL-MCNC: <0.01 NG/ML
TESTOST FREE SERPL-MCNC: 1.1 PG/ML
TESTOST SERPL-MCNC: 10.5 NG/DL
TSH SERPL-ACNC: 0.67 UIU/ML

## (undated) DEVICE — NDL SPINAL 20G X 6" QUINCKE

## (undated) DEVICE — BASIN SET DOUBLE

## (undated) DEVICE — SYR LUER LOK 20CC

## (undated) DEVICE — DRAPE BACK TABLE COVER 44X90"

## (undated) DEVICE — GRID BRACHYTHERAPY EZ 18G

## (undated) DEVICE — PREP CHLORAPREP HI-LITE ORANGE 3ML

## (undated) DEVICE — NDL MAX CORE 18G X 25CM

## (undated) DEVICE — NEOGUARD ENDOCAVITY PROBE COVER 1 X 11.8"

## (undated) DEVICE — GLV 7.5 PROTEXIS (WHITE)

## (undated) DEVICE — DRSG TELFA 3 X 8

## (undated) DEVICE — WARMING BLANKET UPPER ADULT

## (undated) DEVICE — VENODYNE/SCD SLEEVE CALF MEDIUM

## (undated) DEVICE — CATH ANGIO 14G X 3.25"

## (undated) DEVICE — Device

## (undated) DEVICE — BALLOON ENDOCAVITY 2X14CM

## (undated) DEVICE — POSITIONER FOAM EGG CRATE ULNAR 2PCS (PINK)